# Patient Record
Sex: MALE | HISPANIC OR LATINO | Employment: FULL TIME | URBAN - METROPOLITAN AREA
[De-identification: names, ages, dates, MRNs, and addresses within clinical notes are randomized per-mention and may not be internally consistent; named-entity substitution may affect disease eponyms.]

---

## 2021-12-07 PROCEDURE — 93005 ELECTROCARDIOGRAM TRACING: CPT

## 2021-12-07 PROCEDURE — 99285 EMERGENCY DEPT VISIT HI MDM: CPT

## 2021-12-08 ENCOUNTER — APPOINTMENT (OUTPATIENT)
Dept: GENERAL RADIOLOGY | Facility: HOSPITAL | Age: 26
End: 2021-12-08

## 2021-12-08 ENCOUNTER — ANESTHESIA EVENT (OUTPATIENT)
Dept: PERIOP | Facility: HOSPITAL | Age: 26
End: 2021-12-08

## 2021-12-08 ENCOUNTER — ANESTHESIA (OUTPATIENT)
Dept: PERIOP | Facility: HOSPITAL | Age: 26
End: 2021-12-08

## 2021-12-08 ENCOUNTER — HOSPITAL ENCOUNTER (INPATIENT)
Facility: HOSPITAL | Age: 26
LOS: 1 days | Discharge: HOME | End: 2021-12-10
Attending: EMERGENCY MEDICINE | Admitting: SURGERY

## 2021-12-08 ENCOUNTER — APPOINTMENT (OUTPATIENT)
Dept: ULTRASOUND IMAGING | Facility: HOSPITAL | Age: 26
End: 2021-12-08

## 2021-12-08 ENCOUNTER — APPOINTMENT (OUTPATIENT)
Dept: CT IMAGING | Facility: HOSPITAL | Age: 26
End: 2021-12-08

## 2021-12-08 DIAGNOSIS — K81.0 ACUTE CHOLECYSTITIS: Primary | ICD-10-CM

## 2021-12-08 DIAGNOSIS — R11.2 NAUSEA AND VOMITING, INTRACTABILITY OF VOMITING NOT SPECIFIED, UNSPECIFIED VOMITING TYPE: ICD-10-CM

## 2021-12-08 DIAGNOSIS — K80.63 CALCULUS OF GALLBLADDER AND BILE DUCT WITH ACUTE CHOLECYSTITIS, WITH OBSTRUCTION: ICD-10-CM

## 2021-12-08 LAB
ALBUMIN SERPL-MCNC: 4.8 G/DL (ref 3.5–5.2)
ALBUMIN/GLOB SERPL: 1.5 G/DL
ALP SERPL-CCNC: 107 U/L (ref 39–117)
ALT SERPL W P-5'-P-CCNC: 51 U/L (ref 1–41)
ANION GAP SERPL CALCULATED.3IONS-SCNC: 11.5 MMOL/L (ref 5–15)
AST SERPL-CCNC: 29 U/L (ref 1–40)
BASOPHILS # BLD AUTO: 0.05 10*3/MM3 (ref 0–0.2)
BASOPHILS NFR BLD AUTO: 0.5 % (ref 0–1.5)
BILIRUB SERPL-MCNC: 0.2 MG/DL (ref 0–1.2)
BUN SERPL-MCNC: 13 MG/DL (ref 6–20)
BUN/CREAT SERPL: 17.8 (ref 7–25)
CALCIUM SPEC-SCNC: 9.4 MG/DL (ref 8.6–10.5)
CHLORIDE SERPL-SCNC: 101 MMOL/L (ref 98–107)
CO2 SERPL-SCNC: 27.5 MMOL/L (ref 22–29)
CREAT SERPL-MCNC: 0.73 MG/DL (ref 0.76–1.27)
DEPRECATED RDW RBC AUTO: 36.7 FL (ref 37–54)
EOSINOPHIL # BLD AUTO: 0.17 10*3/MM3 (ref 0–0.4)
EOSINOPHIL NFR BLD AUTO: 1.7 % (ref 0.3–6.2)
ERYTHROCYTE [DISTWIDTH] IN BLOOD BY AUTOMATED COUNT: 12.7 % (ref 12.3–15.4)
GFR SERPL CREATININE-BSD FRML MDRD: 131 ML/MIN/1.73
GFR SERPL CREATININE-BSD FRML MDRD: >150 ML/MIN/1.73
GLOBULIN UR ELPH-MCNC: 3.2 GM/DL
GLUCOSE SERPL-MCNC: 131 MG/DL (ref 65–99)
HCT VFR BLD AUTO: 45.1 % (ref 37.5–51)
HGB BLD-MCNC: 15.6 G/DL (ref 13–17.7)
IMM GRANULOCYTES # BLD AUTO: 0.05 10*3/MM3 (ref 0–0.05)
IMM GRANULOCYTES NFR BLD AUTO: 0.5 % (ref 0–0.5)
LIPASE SERPL-CCNC: 32 U/L (ref 13–60)
LYMPHOCYTES # BLD AUTO: 2.7 10*3/MM3 (ref 0.7–3.1)
LYMPHOCYTES NFR BLD AUTO: 26.7 % (ref 19.6–45.3)
MCH RBC QN AUTO: 28.1 PG (ref 26.6–33)
MCHC RBC AUTO-ENTMCNC: 34.6 G/DL (ref 31.5–35.7)
MCV RBC AUTO: 81.3 FL (ref 79–97)
MONOCYTES # BLD AUTO: 0.65 10*3/MM3 (ref 0.1–0.9)
MONOCYTES NFR BLD AUTO: 6.4 % (ref 5–12)
NEUTROPHILS NFR BLD AUTO: 6.5 10*3/MM3 (ref 1.7–7)
NEUTROPHILS NFR BLD AUTO: 64.2 % (ref 42.7–76)
NRBC BLD AUTO-RTO: 0 /100 WBC (ref 0–0.2)
PLATELET # BLD AUTO: 206 10*3/MM3 (ref 140–450)
PMV BLD AUTO: 10.6 FL (ref 6–12)
POTASSIUM SERPL-SCNC: 3.7 MMOL/L (ref 3.5–5.2)
PROT SERPL-MCNC: 8 G/DL (ref 6–8.5)
QT INTERVAL: 373 MS
RBC # BLD AUTO: 5.55 10*6/MM3 (ref 4.14–5.8)
SARS-COV-2 RNA RESP QL NAA+PROBE: NOT DETECTED
SODIUM SERPL-SCNC: 140 MMOL/L (ref 136–145)
TROPONIN T SERPL-MCNC: <0.01 NG/ML (ref 0–0.03)
TROPONIN T SERPL-MCNC: <0.01 NG/ML (ref 0–0.03)
WBC NRBC COR # BLD: 10.12 10*3/MM3 (ref 3.4–10.8)

## 2021-12-08 PROCEDURE — 25010000002 PROPOFOL 10 MG/ML EMULSION: Performed by: NURSE ANESTHETIST, CERTIFIED REGISTERED

## 2021-12-08 PROCEDURE — G0378 HOSPITAL OBSERVATION PER HR: HCPCS

## 2021-12-08 PROCEDURE — C1889 IMPLANT/INSERT DEVICE, NOC: HCPCS | Performed by: SURGERY

## 2021-12-08 PROCEDURE — 83690 ASSAY OF LIPASE: CPT | Performed by: EMERGENCY MEDICINE

## 2021-12-08 PROCEDURE — 25010000002 DIPHENHYDRAMINE PER 50 MG: Performed by: NURSE ANESTHETIST, CERTIFIED REGISTERED

## 2021-12-08 PROCEDURE — 74300 X-RAY BILE DUCTS/PANCREAS: CPT

## 2021-12-08 PROCEDURE — 74177 CT ABD & PELVIS W/CONTRAST: CPT

## 2021-12-08 PROCEDURE — 25010000002 HYDROMORPHONE PER 4 MG: Performed by: NURSE ANESTHETIST, CERTIFIED REGISTERED

## 2021-12-08 PROCEDURE — 88304 TISSUE EXAM BY PATHOLOGIST: CPT | Performed by: SURGERY

## 2021-12-08 PROCEDURE — 25010000002 MORPHINE PER 10 MG: Performed by: EMERGENCY MEDICINE

## 2021-12-08 PROCEDURE — 71046 X-RAY EXAM CHEST 2 VIEWS: CPT

## 2021-12-08 PROCEDURE — 93010 ELECTROCARDIOGRAM REPORT: CPT | Performed by: INTERNAL MEDICINE

## 2021-12-08 PROCEDURE — 84484 ASSAY OF TROPONIN QUANT: CPT | Performed by: EMERGENCY MEDICINE

## 2021-12-08 PROCEDURE — 25010000002 ONDANSETRON PER 1 MG: Performed by: EMERGENCY MEDICINE

## 2021-12-08 PROCEDURE — 25010000002 ONDANSETRON PER 1 MG: Performed by: NURSE ANESTHETIST, CERTIFIED REGISTERED

## 2021-12-08 PROCEDURE — 25010000002 HYDROMORPHONE PER 4 MG: Performed by: SURGERY

## 2021-12-08 PROCEDURE — 0FT44ZZ RESECTION OF GALLBLADDER, PERCUTANEOUS ENDOSCOPIC APPROACH: ICD-10-PCS | Performed by: SURGERY

## 2021-12-08 PROCEDURE — 47563 LAPARO CHOLECYSTECTOMY/GRAPH: CPT

## 2021-12-08 PROCEDURE — 99221 1ST HOSP IP/OBS SF/LOW 40: CPT | Performed by: SURGERY

## 2021-12-08 PROCEDURE — 0 IOTHALAMATE 60 % SOLUTION: Performed by: SURGERY

## 2021-12-08 PROCEDURE — 25010000002 PIPERACILLIN SOD-TAZOBACTAM PER 1 G: Performed by: SURGERY

## 2021-12-08 PROCEDURE — 25010000002 KETOROLAC TROMETHAMINE PER 15 MG: Performed by: NURSE ANESTHETIST, CERTIFIED REGISTERED

## 2021-12-08 PROCEDURE — 76705 ECHO EXAM OF ABDOMEN: CPT

## 2021-12-08 PROCEDURE — 80053 COMPREHEN METABOLIC PANEL: CPT | Performed by: EMERGENCY MEDICINE

## 2021-12-08 PROCEDURE — 25010000002 METOCLOPRAMIDE PER 10 MG: Performed by: PHYSICIAN ASSISTANT

## 2021-12-08 PROCEDURE — 25010000002 KETOROLAC TROMETHAMINE PER 15 MG: Performed by: SURGERY

## 2021-12-08 PROCEDURE — 25010000002 FENTANYL CITRATE (PF) 50 MCG/ML SOLUTION: Performed by: NURSE ANESTHETIST, CERTIFIED REGISTERED

## 2021-12-08 PROCEDURE — 25010000002 DEXAMETHASONE PER 1 MG: Performed by: NURSE ANESTHETIST, CERTIFIED REGISTERED

## 2021-12-08 PROCEDURE — 25010000002 IOPAMIDOL 61 % SOLUTION: Performed by: EMERGENCY MEDICINE

## 2021-12-08 PROCEDURE — 47563 LAPARO CHOLECYSTECTOMY/GRAPH: CPT | Performed by: SURGERY

## 2021-12-08 PROCEDURE — U0003 INFECTIOUS AGENT DETECTION BY NUCLEIC ACID (DNA OR RNA); SEVERE ACUTE RESPIRATORY SYNDROME CORONAVIRUS 2 (SARS-COV-2) (CORONAVIRUS DISEASE [COVID-19]), AMPLIFIED PROBE TECHNIQUE, MAKING USE OF HIGH THROUGHPUT TECHNOLOGIES AS DESCRIBED BY CMS-2020-01-R: HCPCS | Performed by: EMERGENCY MEDICINE

## 2021-12-08 PROCEDURE — 25010000002 SUCCINYLCHOLINE PER 20 MG: Performed by: NURSE ANESTHETIST, CERTIFIED REGISTERED

## 2021-12-08 PROCEDURE — 25010000002 HYDROMORPHONE PER 4 MG: Performed by: EMERGENCY MEDICINE

## 2021-12-08 PROCEDURE — 85025 COMPLETE CBC W/AUTO DIFF WBC: CPT | Performed by: EMERGENCY MEDICINE

## 2021-12-08 DEVICE — SEAL HEMO SURG ARISTA/AH ABS/PWDR 3GM: Type: IMPLANTABLE DEVICE | Site: ABDOMEN | Status: FUNCTIONAL

## 2021-12-08 DEVICE — CLIP LIGAT VASC HORIZON TI MD/LG GRN 6CT: Type: IMPLANTABLE DEVICE | Site: ABDOMEN | Status: FUNCTIONAL

## 2021-12-08 DEVICE — CLIP LIG HEMOLOK PA 6CT MD/LG GRN: Type: IMPLANTABLE DEVICE | Site: ABDOMEN | Status: FUNCTIONAL

## 2021-12-08 RX ORDER — PROMETHAZINE HYDROCHLORIDE 25 MG/1
25 TABLET ORAL ONCE AS NEEDED
Status: DISCONTINUED | OUTPATIENT
Start: 2021-12-08 | End: 2021-12-09 | Stop reason: HOSPADM

## 2021-12-08 RX ORDER — BUPIVACAINE HYDROCHLORIDE AND EPINEPHRINE 5; 5 MG/ML; UG/ML
INJECTION, SOLUTION EPIDURAL; INTRACAUDAL; PERINEURAL AS NEEDED
Status: DISCONTINUED | OUTPATIENT
Start: 2021-12-08 | End: 2021-12-09 | Stop reason: HOSPADM

## 2021-12-08 RX ORDER — KETOROLAC TROMETHAMINE 30 MG/ML
INJECTION, SOLUTION INTRAMUSCULAR; INTRAVENOUS AS NEEDED
Status: DISCONTINUED | OUTPATIENT
Start: 2021-12-08 | End: 2021-12-09 | Stop reason: SURG

## 2021-12-08 RX ORDER — HYDROMORPHONE HCL 110MG/55ML
PATIENT CONTROLLED ANALGESIA SYRINGE INTRAVENOUS AS NEEDED
Status: DISCONTINUED | OUTPATIENT
Start: 2021-12-08 | End: 2021-12-09 | Stop reason: SURG

## 2021-12-08 RX ORDER — MORPHINE SULFATE 10 MG/ML
6 INJECTION INTRAMUSCULAR; INTRAVENOUS; SUBCUTANEOUS ONCE
Status: COMPLETED | OUTPATIENT
Start: 2021-12-08 | End: 2021-12-08

## 2021-12-08 RX ORDER — HYDROMORPHONE HYDROCHLORIDE 1 MG/ML
0.5 INJECTION, SOLUTION INTRAMUSCULAR; INTRAVENOUS; SUBCUTANEOUS
Status: DISCONTINUED | OUTPATIENT
Start: 2021-12-08 | End: 2021-12-09 | Stop reason: HOSPADM

## 2021-12-08 RX ORDER — METOCLOPRAMIDE HYDROCHLORIDE 5 MG/ML
5 INJECTION INTRAMUSCULAR; INTRAVENOUS ONCE
Status: COMPLETED | OUTPATIENT
Start: 2021-12-08 | End: 2021-12-08

## 2021-12-08 RX ORDER — LIDOCAINE HYDROCHLORIDE 20 MG/ML
INJECTION, SOLUTION INFILTRATION; PERINEURAL AS NEEDED
Status: DISCONTINUED | OUTPATIENT
Start: 2021-12-08 | End: 2021-12-09 | Stop reason: SURG

## 2021-12-08 RX ORDER — DEXAMETHASONE SODIUM PHOSPHATE 10 MG/ML
INJECTION INTRAMUSCULAR; INTRAVENOUS AS NEEDED
Status: DISCONTINUED | OUTPATIENT
Start: 2021-12-08 | End: 2021-12-09 | Stop reason: SURG

## 2021-12-08 RX ORDER — DIPHENHYDRAMINE HYDROCHLORIDE 50 MG/ML
12.5 INJECTION INTRAMUSCULAR; INTRAVENOUS
Status: DISCONTINUED | OUTPATIENT
Start: 2021-12-08 | End: 2021-12-09 | Stop reason: HOSPADM

## 2021-12-08 RX ORDER — MIDAZOLAM HYDROCHLORIDE 1 MG/ML
1 INJECTION INTRAMUSCULAR; INTRAVENOUS
Status: DISCONTINUED | OUTPATIENT
Start: 2021-12-08 | End: 2021-12-09 | Stop reason: HOSPADM

## 2021-12-08 RX ORDER — ONDANSETRON 2 MG/ML
4 INJECTION INTRAMUSCULAR; INTRAVENOUS ONCE
Status: COMPLETED | OUTPATIENT
Start: 2021-12-08 | End: 2021-12-08

## 2021-12-08 RX ORDER — IBUPROFEN 600 MG/1
600 TABLET ORAL ONCE AS NEEDED
Status: DISCONTINUED | OUTPATIENT
Start: 2021-12-08 | End: 2021-12-09 | Stop reason: HOSPADM

## 2021-12-08 RX ORDER — CEFAZOLIN SODIUM 2 G/100ML
2 INJECTION, SOLUTION INTRAVENOUS ONCE
Status: CANCELLED | OUTPATIENT
Start: 2021-12-08 | End: 2021-12-08

## 2021-12-08 RX ORDER — SODIUM CHLORIDE 0.9 % (FLUSH) 0.9 %
3 SYRINGE (ML) INJECTION EVERY 12 HOURS SCHEDULED
Status: DISCONTINUED | OUTPATIENT
Start: 2021-12-08 | End: 2021-12-09 | Stop reason: HOSPADM

## 2021-12-08 RX ORDER — SODIUM CHLORIDE 0.9 % (FLUSH) 0.9 %
3-10 SYRINGE (ML) INJECTION AS NEEDED
Status: DISCONTINUED | OUTPATIENT
Start: 2021-12-08 | End: 2021-12-09 | Stop reason: HOSPADM

## 2021-12-08 RX ORDER — FENTANYL CITRATE 50 UG/ML
INJECTION, SOLUTION INTRAMUSCULAR; INTRAVENOUS AS NEEDED
Status: DISCONTINUED | OUTPATIENT
Start: 2021-12-08 | End: 2021-12-09 | Stop reason: SURG

## 2021-12-08 RX ORDER — SODIUM CHLORIDE 9 MG/ML
INJECTION, SOLUTION INTRAVENOUS AS NEEDED
Status: DISCONTINUED | OUTPATIENT
Start: 2021-12-08 | End: 2021-12-09 | Stop reason: HOSPADM

## 2021-12-08 RX ORDER — HYDROCODONE BITARTRATE AND ACETAMINOPHEN 7.5; 325 MG/1; MG/1
1 TABLET ORAL ONCE AS NEEDED
Status: DISCONTINUED | OUTPATIENT
Start: 2021-12-08 | End: 2021-12-09 | Stop reason: HOSPADM

## 2021-12-08 RX ORDER — SODIUM CHLORIDE, SODIUM LACTATE, POTASSIUM CHLORIDE, CALCIUM CHLORIDE 600; 310; 30; 20 MG/100ML; MG/100ML; MG/100ML; MG/100ML
INJECTION, SOLUTION INTRAVENOUS CONTINUOUS PRN
Status: DISCONTINUED | OUTPATIENT
Start: 2021-12-08 | End: 2021-12-09 | Stop reason: SURG

## 2021-12-08 RX ORDER — PROPOFOL 10 MG/ML
VIAL (ML) INTRAVENOUS AS NEEDED
Status: DISCONTINUED | OUTPATIENT
Start: 2021-12-08 | End: 2021-12-09 | Stop reason: SURG

## 2021-12-08 RX ORDER — DIPHENHYDRAMINE HYDROCHLORIDE 50 MG/ML
INJECTION INTRAMUSCULAR; INTRAVENOUS AS NEEDED
Status: DISCONTINUED | OUTPATIENT
Start: 2021-12-08 | End: 2021-12-09 | Stop reason: SURG

## 2021-12-08 RX ORDER — NALOXONE HCL 0.4 MG/ML
0.2 VIAL (ML) INJECTION AS NEEDED
Status: DISCONTINUED | OUTPATIENT
Start: 2021-12-08 | End: 2021-12-09 | Stop reason: HOSPADM

## 2021-12-08 RX ORDER — FLUMAZENIL 0.1 MG/ML
0.2 INJECTION INTRAVENOUS AS NEEDED
Status: DISCONTINUED | OUTPATIENT
Start: 2021-12-08 | End: 2021-12-09 | Stop reason: HOSPADM

## 2021-12-08 RX ORDER — SODIUM CHLORIDE 0.9 % (FLUSH) 0.9 %
3-10 SYRINGE (ML) INJECTION AS NEEDED
Status: DISCONTINUED | OUTPATIENT
Start: 2021-12-08 | End: 2021-12-10 | Stop reason: HOSPADM

## 2021-12-08 RX ORDER — ROCURONIUM BROMIDE 10 MG/ML
INJECTION, SOLUTION INTRAVENOUS AS NEEDED
Status: DISCONTINUED | OUTPATIENT
Start: 2021-12-08 | End: 2021-12-09 | Stop reason: SURG

## 2021-12-08 RX ORDER — LIDOCAINE HYDROCHLORIDE 20 MG/ML
15 SOLUTION OROPHARYNGEAL ONCE
Status: COMPLETED | OUTPATIENT
Start: 2021-12-08 | End: 2021-12-08

## 2021-12-08 RX ORDER — ONDANSETRON 2 MG/ML
4 INJECTION INTRAMUSCULAR; INTRAVENOUS EVERY 6 HOURS PRN
Status: DISCONTINUED | OUTPATIENT
Start: 2021-12-08 | End: 2021-12-10 | Stop reason: HOSPADM

## 2021-12-08 RX ORDER — SODIUM CHLORIDE, SODIUM LACTATE, POTASSIUM CHLORIDE, CALCIUM CHLORIDE 600; 310; 30; 20 MG/100ML; MG/100ML; MG/100ML; MG/100ML
75 INJECTION, SOLUTION INTRAVENOUS CONTINUOUS
Status: DISCONTINUED | OUTPATIENT
Start: 2021-12-08 | End: 2021-12-10

## 2021-12-08 RX ORDER — ALUMINA, MAGNESIA, AND SIMETHICONE 2400; 2400; 240 MG/30ML; MG/30ML; MG/30ML
15 SUSPENSION ORAL ONCE
Status: COMPLETED | OUTPATIENT
Start: 2021-12-08 | End: 2021-12-08

## 2021-12-08 RX ORDER — LABETALOL HYDROCHLORIDE 5 MG/ML
5 INJECTION, SOLUTION INTRAVENOUS
Status: DISCONTINUED | OUTPATIENT
Start: 2021-12-08 | End: 2021-12-09 | Stop reason: HOSPADM

## 2021-12-08 RX ORDER — FAMOTIDINE 10 MG/ML
20 INJECTION, SOLUTION INTRAVENOUS ONCE
Status: COMPLETED | OUTPATIENT
Start: 2021-12-08 | End: 2021-12-08

## 2021-12-08 RX ORDER — SODIUM CHLORIDE 0.9 % (FLUSH) 0.9 %
3 SYRINGE (ML) INJECTION EVERY 12 HOURS SCHEDULED
Status: DISCONTINUED | OUTPATIENT
Start: 2021-12-08 | End: 2021-12-10 | Stop reason: HOSPADM

## 2021-12-08 RX ORDER — PROMETHAZINE HYDROCHLORIDE 25 MG/1
25 SUPPOSITORY RECTAL ONCE AS NEEDED
Status: DISCONTINUED | OUTPATIENT
Start: 2021-12-08 | End: 2021-12-09 | Stop reason: HOSPADM

## 2021-12-08 RX ORDER — ONDANSETRON 2 MG/ML
4 INJECTION INTRAMUSCULAR; INTRAVENOUS ONCE AS NEEDED
Status: DISCONTINUED | OUTPATIENT
Start: 2021-12-08 | End: 2021-12-09 | Stop reason: HOSPADM

## 2021-12-08 RX ORDER — HYDROMORPHONE HYDROCHLORIDE 1 MG/ML
0.5 INJECTION, SOLUTION INTRAMUSCULAR; INTRAVENOUS; SUBCUTANEOUS
Status: DISCONTINUED | OUTPATIENT
Start: 2021-12-08 | End: 2021-12-10 | Stop reason: HOSPADM

## 2021-12-08 RX ORDER — SUCCINYLCHOLINE CHLORIDE 20 MG/ML
INJECTION INTRAMUSCULAR; INTRAVENOUS AS NEEDED
Status: DISCONTINUED | OUTPATIENT
Start: 2021-12-08 | End: 2021-12-09 | Stop reason: SURG

## 2021-12-08 RX ORDER — LIDOCAINE HYDROCHLORIDE 10 MG/ML
0.5 INJECTION, SOLUTION EPIDURAL; INFILTRATION; INTRACAUDAL; PERINEURAL ONCE AS NEEDED
Status: DISCONTINUED | OUTPATIENT
Start: 2021-12-08 | End: 2021-12-09 | Stop reason: HOSPADM

## 2021-12-08 RX ORDER — KETOROLAC TROMETHAMINE 15 MG/ML
15 INJECTION, SOLUTION INTRAMUSCULAR; INTRAVENOUS EVERY 6 HOURS PRN
Status: DISCONTINUED | OUTPATIENT
Start: 2021-12-08 | End: 2021-12-10 | Stop reason: HOSPADM

## 2021-12-08 RX ORDER — ONDANSETRON 2 MG/ML
INJECTION INTRAMUSCULAR; INTRAVENOUS AS NEEDED
Status: DISCONTINUED | OUTPATIENT
Start: 2021-12-08 | End: 2021-12-09 | Stop reason: SURG

## 2021-12-08 RX ORDER — HYDROMORPHONE HYDROCHLORIDE 1 MG/ML
0.5 INJECTION, SOLUTION INTRAMUSCULAR; INTRAVENOUS; SUBCUTANEOUS ONCE
Status: COMPLETED | OUTPATIENT
Start: 2021-12-08 | End: 2021-12-08

## 2021-12-08 RX ORDER — MAGNESIUM HYDROXIDE 1200 MG/15ML
LIQUID ORAL AS NEEDED
Status: DISCONTINUED | OUTPATIENT
Start: 2021-12-08 | End: 2021-12-09 | Stop reason: HOSPADM

## 2021-12-08 RX ORDER — DIPHENHYDRAMINE HCL 25 MG
25 CAPSULE ORAL
Status: DISCONTINUED | OUTPATIENT
Start: 2021-12-08 | End: 2021-12-09 | Stop reason: HOSPADM

## 2021-12-08 RX ORDER — FENTANYL CITRATE 50 UG/ML
50 INJECTION, SOLUTION INTRAMUSCULAR; INTRAVENOUS
Status: DISCONTINUED | OUTPATIENT
Start: 2021-12-08 | End: 2021-12-09 | Stop reason: HOSPADM

## 2021-12-08 RX ORDER — EPHEDRINE SULFATE 50 MG/ML
5 INJECTION, SOLUTION INTRAVENOUS ONCE AS NEEDED
Status: DISCONTINUED | OUTPATIENT
Start: 2021-12-08 | End: 2021-12-09 | Stop reason: HOSPADM

## 2021-12-08 RX ORDER — SODIUM CHLORIDE, SODIUM LACTATE, POTASSIUM CHLORIDE, CALCIUM CHLORIDE 600; 310; 30; 20 MG/100ML; MG/100ML; MG/100ML; MG/100ML
9 INJECTION, SOLUTION INTRAVENOUS CONTINUOUS
Status: DISCONTINUED | OUTPATIENT
Start: 2021-12-08 | End: 2021-12-09

## 2021-12-08 RX ORDER — NITROGLYCERIN 0.4 MG/1
0.4 TABLET SUBLINGUAL
Status: DISCONTINUED | OUTPATIENT
Start: 2021-12-08 | End: 2021-12-08

## 2021-12-08 RX ORDER — ONDANSETRON 4 MG/1
4 TABLET, FILM COATED ORAL EVERY 6 HOURS PRN
Status: DISCONTINUED | OUTPATIENT
Start: 2021-12-08 | End: 2021-12-10 | Stop reason: HOSPADM

## 2021-12-08 RX ORDER — SODIUM CHLORIDE 0.9 % (FLUSH) 0.9 %
10 SYRINGE (ML) INJECTION AS NEEDED
Status: DISCONTINUED | OUTPATIENT
Start: 2021-12-08 | End: 2021-12-10 | Stop reason: HOSPADM

## 2021-12-08 RX ORDER — HYDRALAZINE HYDROCHLORIDE 20 MG/ML
5 INJECTION INTRAMUSCULAR; INTRAVENOUS
Status: DISCONTINUED | OUTPATIENT
Start: 2021-12-08 | End: 2021-12-09 | Stop reason: HOSPADM

## 2021-12-08 RX ORDER — OXYCODONE AND ACETAMINOPHEN 7.5; 325 MG/1; MG/1
1 TABLET ORAL EVERY 4 HOURS PRN
Status: DISCONTINUED | OUTPATIENT
Start: 2021-12-08 | End: 2021-12-09 | Stop reason: HOSPADM

## 2021-12-08 RX ADMIN — FAMOTIDINE 20 MG: 10 INJECTION, SOLUTION INTRAVENOUS at 00:22

## 2021-12-08 RX ADMIN — KETOROLAC TROMETHAMINE 15 MG: 15 INJECTION, SOLUTION INTRAMUSCULAR; INTRAVENOUS at 16:33

## 2021-12-08 RX ADMIN — ROCURONIUM BROMIDE 10 MG: 50 INJECTION INTRAVENOUS at 22:13

## 2021-12-08 RX ADMIN — SODIUM CHLORIDE, PRESERVATIVE FREE 3 ML: 5 INJECTION INTRAVENOUS at 08:14

## 2021-12-08 RX ADMIN — SUCCINYLCHOLINE CHLORIDE 100 MG: 20 INJECTION, SOLUTION INTRAMUSCULAR; INTRAVENOUS; PARENTERAL at 21:09

## 2021-12-08 RX ADMIN — SODIUM CHLORIDE, POTASSIUM CHLORIDE, SODIUM LACTATE AND CALCIUM CHLORIDE 9 ML/HR: 600; 310; 30; 20 INJECTION, SOLUTION INTRAVENOUS at 17:24

## 2021-12-08 RX ADMIN — MORPHINE SULFATE 6 MG: 10 INJECTION, SOLUTION INTRAMUSCULAR; INTRAVENOUS at 01:26

## 2021-12-08 RX ADMIN — FAMOTIDINE 20 MG: 10 INJECTION INTRAVENOUS at 17:24

## 2021-12-08 RX ADMIN — ROCURONIUM BROMIDE 10 MG: 50 INJECTION INTRAVENOUS at 21:09

## 2021-12-08 RX ADMIN — SODIUM CHLORIDE, POTASSIUM CHLORIDE, SODIUM LACTATE AND CALCIUM CHLORIDE 100 ML/HR: 600; 310; 30; 20 INJECTION, SOLUTION INTRAVENOUS at 19:15

## 2021-12-08 RX ADMIN — FENTANYL CITRATE 100 MCG: 0.05 INJECTION, SOLUTION INTRAMUSCULAR; INTRAVENOUS at 21:09

## 2021-12-08 RX ADMIN — PROPOFOL 200 MG: 10 INJECTION, EMULSION INTRAVENOUS at 21:09

## 2021-12-08 RX ADMIN — SODIUM CHLORIDE, POTASSIUM CHLORIDE, SODIUM LACTATE AND CALCIUM CHLORIDE: 600; 310; 30; 20 INJECTION, SOLUTION INTRAVENOUS at 21:06

## 2021-12-08 RX ADMIN — DIPHENHYDRAMINE HYDROCHLORIDE 25 MG: 50 INJECTION, SOLUTION INTRAMUSCULAR; INTRAVENOUS at 21:40

## 2021-12-08 RX ADMIN — HYDROMORPHONE HYDROCHLORIDE 0.5 MG: 1 INJECTION, SOLUTION INTRAMUSCULAR; INTRAVENOUS; SUBCUTANEOUS at 10:51

## 2021-12-08 RX ADMIN — METOCLOPRAMIDE HYDROCHLORIDE 5 MG: 5 INJECTION INTRAMUSCULAR; INTRAVENOUS at 02:23

## 2021-12-08 RX ADMIN — TAZOBACTAM SODIUM AND PIPERACILLIN SODIUM 3.38 G: 375; 3 INJECTION, SOLUTION INTRAVENOUS at 10:47

## 2021-12-08 RX ADMIN — MAGNESIUM HYDROXIDE,ALUMINUM HYDROXICE,SIMETHICONE 15 ML: 240; 2400; 2400 SUSPENSION ORAL at 00:23

## 2021-12-08 RX ADMIN — KETOROLAC TROMETHAMINE 30 MG: 30 INJECTION, SOLUTION INTRAMUSCULAR at 23:44

## 2021-12-08 RX ADMIN — ONDANSETRON 4 MG: 2 INJECTION INTRAMUSCULAR; INTRAVENOUS at 01:24

## 2021-12-08 RX ADMIN — HYDROMORPHONE HYDROCHLORIDE 0.5 MG: 1 INJECTION, SOLUTION INTRAMUSCULAR; INTRAVENOUS; SUBCUTANEOUS at 14:46

## 2021-12-08 RX ADMIN — ROCURONIUM BROMIDE 35 MG: 50 INJECTION INTRAVENOUS at 21:24

## 2021-12-08 RX ADMIN — SODIUM CHLORIDE, POTASSIUM CHLORIDE, SODIUM LACTATE AND CALCIUM CHLORIDE 100 ML/HR: 600; 310; 30; 20 INJECTION, SOLUTION INTRAVENOUS at 08:09

## 2021-12-08 RX ADMIN — HYDROMORPHONE HYDROCHLORIDE 0.5 MG: 2 INJECTION, SOLUTION INTRAMUSCULAR; INTRAVENOUS; SUBCUTANEOUS at 22:30

## 2021-12-08 RX ADMIN — LIDOCAINE HYDROCHLORIDE 15 ML: 20 SOLUTION ORAL; TOPICAL at 00:24

## 2021-12-08 RX ADMIN — IOPAMIDOL 85 ML: 612 INJECTION, SOLUTION INTRAVENOUS at 01:38

## 2021-12-08 RX ADMIN — DEXAMETHASONE SODIUM PHOSPHATE 8 MG: 10 INJECTION INTRAMUSCULAR; INTRAVENOUS at 21:13

## 2021-12-08 RX ADMIN — HYDROMORPHONE HYDROCHLORIDE 0.5 MG: 1 INJECTION, SOLUTION INTRAMUSCULAR; INTRAVENOUS; SUBCUTANEOUS at 02:23

## 2021-12-08 RX ADMIN — SUGAMMADEX 200 MG: 100 INJECTION, SOLUTION INTRAVENOUS at 23:50

## 2021-12-08 RX ADMIN — ONDANSETRON 4 MG: 2 INJECTION INTRAMUSCULAR; INTRAVENOUS at 22:51

## 2021-12-08 RX ADMIN — HYDROMORPHONE HYDROCHLORIDE 0.5 MG: 2 INJECTION, SOLUTION INTRAMUSCULAR; INTRAVENOUS; SUBCUTANEOUS at 21:49

## 2021-12-08 RX ADMIN — HYDROMORPHONE HYDROCHLORIDE 0.5 MG: 1 INJECTION, SOLUTION INTRAMUSCULAR; INTRAVENOUS; SUBCUTANEOUS at 08:09

## 2021-12-08 RX ADMIN — SODIUM CHLORIDE, POTASSIUM CHLORIDE, SODIUM LACTATE AND CALCIUM CHLORIDE: 600; 310; 30; 20 INJECTION, SOLUTION INTRAVENOUS at 22:48

## 2021-12-08 RX ADMIN — LIDOCAINE HYDROCHLORIDE 50 MG: 20 INJECTION, SOLUTION INFILTRATION; PERINEURAL at 21:09

## 2021-12-08 RX ADMIN — TAZOBACTAM SODIUM AND PIPERACILLIN SODIUM 3.38 G: 375; 3 INJECTION, SOLUTION INTRAVENOUS at 19:12

## 2021-12-08 NOTE — ED NOTES
"Pt's primary language is [place] and Information was obtained via  [id number]. Pt comes to ED via PV for c/o \"air on top of his heart and back.\" Pt said he was working around noon today and drank a Coke and then felt like air became trapped on heart and back, described as \"exploding on the inside\" to the point where he cannot speak or lay down, 9/10. Pt denies NVD. Pt has allergies to shrimp. Address is [address] [place] [address] [address], [address]. Pt said he has had this pain before about a year ago and it lasted for 2 days. Pt denies abuse, SI, HI, and falling. Pt said he does not take any medications, has no medical conditions and has never had a surgery. Pt said he does not smoke or drink alcohol. Pt said his weight is 160 lbs.     Isatu Jorgensen, MARCIE  12/08/21 0001    "

## 2021-12-08 NOTE — PLAN OF CARE
Goal Outcome Evaluation:              Outcome Summary: Pt admitted from ED with cholecystitis and gallstones. Pt aox4, up ad michelle and on room air. Pt speaks little english but understands well. VSS will continue to monitor.

## 2021-12-08 NOTE — ED NOTES
..  Nursing report ED to floor  Gal Shirley  26 y.o.  male    HPI :   Chief Complaint   Patient presents with   • Chest Pain   • Back Pain       Admitting doctor:   Richard Cruz MD    Admitting diagnosis:   The primary encounter diagnosis was Acute cholecystitis. Diagnoses of Nausea and vomiting, intractability of vomiting not specified, unspecified vomiting type and Calculus of gallbladder and bile duct with acute cholecystitis, with obstruction were also pertinent to this visit.    Code status:   Current Code Status     Date Active Code Status Order ID Comments User Context       Not on file    Advance Care Planning Activity          Allergies:   Patient has no known allergies.    Intake and Output  No intake or output data in the 24 hours ending 12/08/21 0604    Weight:       12/08/21  0013   Weight: 72.6 kg (160 lb)       Most recent vitals:   Vitals:    12/08/21 0316 12/08/21 0330 12/08/21 0401 12/08/21 0501   BP: 134/89  130/87 127/89   BP Location:       Patient Position:       Pulse: 82 80 76 76   Resp:       Temp:       TempSrc:       SpO2: 97% 95% 94% 94%   Weight:       Height:           Active LDAs/IV Access:   Lines, Drains & Airways     Active LDAs     Name Placement date Placement time Site Days    Peripheral IV 12/08/21 0013 Left Antecubital 12/08/21  0013  Antecubital  less than 1                Labs (abnormal labs have a star):   Labs Reviewed   COMPREHENSIVE METABOLIC PANEL - Abnormal; Notable for the following components:       Result Value    Glucose 131 (*)     Creatinine 0.73 (*)     ALT (SGPT) 51 (*)     All other components within normal limits    Narrative:     GFR Normal >60  Chronic Kidney Disease <60  Kidney Failure <15     CBC WITH AUTO DIFFERENTIAL - Abnormal; Notable for the following components:    RDW-SD 36.7 (*)     All other components within normal limits   COVID-19,[facility] IN-HOUSE CEPHEID/ELBA, NP SWAB IN TRANSPORT MEDIA 8-12 HR TAT - Normal    Narrative:      Fact sheet for providers: https://www.fda.gov/media/585990/download     Fact sheet for patients: https://www.fda.gov/media/301617/download   LIPASE - Normal   TROPONIN (IN-HOUSE) - Normal    Narrative:     Troponin T Reference Range:  <= 0.03 ng/mL-   Negative for AMI  >0.03 ng/mL-     Abnormal for myocardial necrosis.  Clinicians would have to utilize clinical acumen, EKG, Troponin and serial changes to determine if it is an Acute Myocardial Infarction or myocardial injury due to an underlying chronic condition.       Results may be falsely decreased if patient taking Biotin.     TROPONIN (IN-HOUSE) - Normal    Narrative:     Troponin T Reference Range:  <= 0.03 ng/mL-   Negative for AMI  >0.03 ng/mL-     Abnormal for myocardial necrosis.  Clinicians would have to utilize clinical acumen, EKG, Troponin and serial changes to determine if it is an Acute Myocardial Infarction or myocardial injury due to an underlying chronic condition.       Results may be falsely decreased if patient taking Biotin.     COVID PRE-OP / PRE-PROCEDURE SCREENING ORDER (NO ISOLATION)    Narrative:     The following orders were created for panel order COVID PRE-OP / PRE-PROCEDURE SCREENING ORDER (NO ISOLATION) - Swab, Nasopharynx.  Procedure                               Abnormality         Status                     ---------                               -----------         ------                     COVID-19,[facility]-HOUSE...[[id number]]  Normal              Final result                 Please view results for these tests on the individual orders.   CBC AND DIFFERENTIAL    Narrative:     The following orders were created for panel order CBC & Differential.  Procedure                               Abnormality         Status                     ---------                               -----------         ------                     CBC Auto Differential[[id number]]        Abnormal            Final result                 Please view results  for these tests on the individual orders.       EKG:   ECG 12 Lead   Preliminary Result   HEART RATE= 63  bpm   RR Interval= 952  ms   DC Interval= 193  ms   P Horizontal Axis= 46  deg   P Front Axis= 13  deg   QRSD Interval= 102  ms   QT Interval= 373  ms   QRS Axis= 82  deg   T Wave Axis= 37  deg   - NORMAL ECG -   Sinus rhythm   ST elev, probable normal early repol pattern   Electronically Signed By:    Date and Time of Study: 2021-12-08 00:02:13          Meds given in ED:   Medications   sodium chloride 0.9 % flush 10 mL (has no administration in time range)   nitroglycerin (NITROSTAT) SL tablet 0.4 mg (has no administration in time range)   aluminum-magnesium hydroxide-simethicone (MAALOX MAX) 400-400-40 MG/5ML suspension 15 mL (15 mL Oral Given 12/8/21 0023)   Lidocaine Viscous HCl (XYLOCAINE) 2 % solution 15 mL (15 mL Mouth/Throat Given 12/8/21 0024)   famotidine (PEPCID) injection 20 mg (20 mg Intravenous Given 12/8/21 0022)   Morphine injection 6 mg (6 mg Intravenous Given 12/8/21 0126)   ondansetron (ZOFRAN) injection 4 mg (4 mg Intravenous Given 12/8/21 0124)   iopamidol (ISOVUE-300) 61 % injection 100 mL (85 mL Intravenous Given 12/8/21 0138)   metoclopramide (REGLAN) injection 5 mg (5 mg Intravenous Given 12/8/21 0223)   HYDROmorphone (DILAUDID) injection 0.5 mg (0.5 mg Intravenous Given 12/8/21 0223)       Imaging results:  XR Chest 2 View    Result Date: 12/8/2021  No evidence for active disease in the chest.  This report was finalized on 12/8/2021 12:55 AM by Dr. Tunde North M.D.      CT Abdomen Pelvis With Contrast    Result Date: 12/8/2021  1.  Gallbladder is distended. There is motion related artifact with equivocal mild gallbladder wall thickening. The findings may represent cholecystitis and this needs correlation with clinical data and could be further evaluated with right upper quadrant sonogram. 2. Short segment of the appendix is mildly enlarged measuring 8 mm without further evidence  to suggest appendicitis or periappendiceal inflammation. Appendicitis is doubted though follow-up could be obtained if symptoms persist or worsen.  Discussed with Adarsh Christine in the emergency department 12/8/2021 at 2:00 AM.  Radiation dose reduction techniques were utilized, including automated exposure control and exposure modulation based on body size.  This report was finalized on 12/8/2021 2:02 AM by Dr. Tunde North M.D.      US Gallbladder    Result Date: 12/8/2021  Findings are cholecystitis. The gallbladder is distended and there are gallstones with area of shadowing consistent with a stone near the gallbladder neck. The common duct measures 6 mm which is mildly dilated without intrahepatic biliary duct dilatation.  Discussed with GINA Mclain in the emergency department 12/8/2021 at 3:10 AM.  This report was finalized on 12/8/2021 3:13 AM by Dr. Tunde North M.D.        Ambulatory status:   - ambulatory      Social issues:   Social History     Socioeconomic History   • Marital status:        NIH Stroke Scale:        Nursing report ED to floor:       Hebert Norris, RN  12/08/21 0605

## 2021-12-08 NOTE — ANESTHESIA PREPROCEDURE EVALUATION
Anesthesia Evaluation     NPO Solid Status: > 8 hours             Airway   Mallampati: I  TM distance: >3 FB  Neck ROM: full  No difficulty expected  Dental          Pulmonary - normal exam   Cardiovascular - normal exam        Neuro/Psych  GI/Hepatic/Renal/Endo      Musculoskeletal     Abdominal    Substance History      OB/GYN          Other                        Anesthesia Plan    ASA 1     general     intravenous induction     Anesthetic plan, all risks, benefits, and alternatives have been provided, discussed and informed consent has been obtained with: patient.

## 2021-12-08 NOTE — ED PROVIDER NOTES
EMERGENCY DEPARTMENT ENCOUNTER    Room Number:  23/23  Date of encounter:  12/8/2021  PCP: Provider, No Known  Historian: Patient Via     Patient was placed in face mask during triage process. Patient was wearing facemask when I entered the room and throughout our encounter. I wore full protective equipment throughout this patient encounter including a face mask, eye protection, and gloves. Hand hygiene was performed before donning protective equipment and again following doffing of PPE after leaving the room.    HPI:  Chief Complaint: Epigastric/chest pain  A complete HPI/ROS/PMH/PSH/SH/FH are unobtainable due to: N/A   Context: Gal Shirley is a 26 y.o. male who presents to the ED c/o development of epigastric/lower substernal chest discomfort that radiates into his back.  He describes it like air being trapped.  This began around noon today at work.  No clear exacerbating or relieving factors.  It is moderately severe at this time.  History of similar several years ago.  Denies cigarette use and frequent alcohol use.  Moderate discomfort at this time.  No cough or fever.  No vomiting or diarrhea      MEDICAL HISTORY REVIEW  No prior cardiac history    PAST MEDICAL HISTORY  Active Ambulatory Problems     Diagnosis Date Noted   • No Active Ambulatory Problems     Resolved Ambulatory Problems     Diagnosis Date Noted   • No Resolved Ambulatory Problems     No Additional Past Medical History         PAST SURGICAL HISTORY  No past surgical history on file.      FAMILY HISTORY  No family history on file.      SOCIAL HISTORY  Social History     Socioeconomic History   • Marital status:          ALLERGIES  Patient has no known allergies.        REVIEW OF SYSTEMS  Review of Systems     All systems reviewed and negative except for those discussed in HPI.       PHYSICAL EXAM    I have reviewed the triage vital signs and nursing notes.    ED Triage Vitals [12/08/21 0013]   Temp Heart Rate Resp BP  SpO2   98.5 °F (36.9 °C) 66 16 132/82 97 %      Temp src Heart Rate Source Patient Position BP Location FiO2 (%)   Oral Monitor Sitting Right arm --       Physical Exam    Physical Exam   Constitutional: No distress.  Uncomfortable appearing but not overtly toxic.  HENT:  Head: Normocephalic and atraumatic.   Oropharynx: Mucous membranes are moist.   Eyes: No scleral icterus. No conjunctival pallor.  Neck: Painless range of motion noted. Neck supple.   Cardiovascular: Normal rate, regular rhythm and intact distal pulses.  Pulmonary/Chest: No respiratory distress.  Occasionally splinting his respirations.  Clear to auscultation bilaterally.    Abdominal: Soft. There is moderate epigastric tenderness. There is no rebound and no guarding.  No rigidity  Musculoskeletal: Moves all extremities equally. There is no pedal edema or calf tenderness.   Neurological: Alert.  Baseline strength and sensation noted.   Skin: Skin is pink, warm, and dry. No pallor.   Psychiatric: Mood and affect normal.   Nursing note and vitals reviewed.    LAB RESULTS  Recent Results (from the past 24 hour(s))   ECG 12 Lead    Collection Time: 12/08/21 12:02 AM   Result Value Ref Range    QT Interval 373 ms   Comprehensive Metabolic Panel    Collection Time: 12/08/21 12:21 AM    Specimen: Blood   Result Value Ref Range    Glucose 131 (H) 65 - 99 mg/dL    BUN 13 6 - 20 mg/dL    Creatinine 0.73 (L) 0.76 - 1.27 mg/dL    Sodium 140 136 - 145 mmol/L    Potassium 3.7 3.5 - 5.2 mmol/L    Chloride 101 98 - 107 mmol/L    CO2 27.5 22.0 - 29.0 mmol/L    Calcium 9.4 8.6 - 10.5 mg/dL    Total Protein 8.0 6.0 - 8.5 g/dL    Albumin 4.80 3.50 - 5.20 g/dL    ALT (SGPT) 51 (H) 1 - 41 U/L    AST (SGOT) 29 1 - 40 U/L    Alkaline Phosphatase 107 39 - 117 U/L    Total Bilirubin 0.2 0.0 - 1.2 mg/dL    eGFR Non African Amer 131 >60 mL/min/1.73    eGFR  African Amer >150 >60 mL/min/1.73    Globulin 3.2 gm/dL    A/G Ratio 1.5 g/dL    BUN/Creatinine Ratio 17.8 7.0 - 25.0     Anion Gap 11.5 5.0 - 15.0 mmol/L   Lipase    Collection Time: 12/08/21 12:21 AM    Specimen: Blood   Result Value Ref Range    Lipase 32 13 - 60 U/L   Troponin    Collection Time: 12/08/21 12:21 AM    Specimen: Blood   Result Value Ref Range    Troponin T <0.010 0.000 - 0.030 ng/mL   CBC Auto Differential    Collection Time: 12/08/21 12:21 AM    Specimen: Blood   Result Value Ref Range    WBC 10.12 3.40 - 10.80 10*3/mm3    RBC 5.55 4.14 - 5.80 10*6/mm3    Hemoglobin 15.6 13.0 - 17.7 g/dL    Hematocrit 45.1 37.5 - 51.0 %    MCV 81.3 79.0 - 97.0 fL    MCH 28.1 26.6 - 33.0 pg    MCHC 34.6 31.5 - 35.7 g/dL    RDW 12.7 12.3 - 15.4 %    RDW-SD 36.7 (L) 37.0 - 54.0 fl    MPV 10.6 6.0 - 12.0 fL    Platelets 206 140 - 450 10*3/mm3    Neutrophil % 64.2 42.7 - 76.0 %    Lymphocyte % 26.7 19.6 - 45.3 %    Monocyte % 6.4 5.0 - 12.0 %    Eosinophil % 1.7 0.3 - 6.2 %    Basophil % 0.5 0.0 - 1.5 %    Immature Grans % 0.5 0.0 - 0.5 %    Neutrophils, Absolute 6.50 1.70 - 7.00 10*3/mm3    Lymphocytes, Absolute 2.70 0.70 - 3.10 10*3/mm3    Monocytes, Absolute 0.65 0.10 - 0.90 10*3/mm3    Eosinophils, Absolute 0.17 0.00 - 0.40 10*3/mm3    Basophils, Absolute 0.05 0.00 - 0.20 10*3/mm3    Immature Grans, Absolute 0.05 0.00 - 0.05 10*3/mm3    nRBC 0.0 0.0 - 0.2 /100 WBC       Ordered the above labs and independently reviewed the results.        RADIOLOGY  XR Chest 2 View    Result Date: 12/8/2021  CHEST: 2 VIEWS  HISTORY: Chest pain and back pain.  COMPARISON: None.  FINDINGS:Cardiomediastinal silhouette is normal. Lungs appear clear and there is no evidence for pulmonary edema or pleural effusion or infiltrate.      No evidence for active disease in the chest.  This report was finalized on 12/8/2021 12:55 AM by Dr. Tunde North M.D.        I ordered the above noted radiological studies. Reviewed by me and discussed with radiologist.  See dictation for official radiology interpretation.      PROCEDURES    Procedures        MEDICATIONS  GIVEN IN ER    Medications   sodium chloride 0.9 % flush 10 mL (has no administration in time range)   nitroglycerin (NITROSTAT) SL tablet 0.4 mg (has no administration in time range)   Morphine injection 6 mg (has no administration in time range)   aluminum-magnesium hydroxide-simethicone (MAALOX MAX) 400-400-40 MG/5ML suspension 15 mL (15 mL Oral Given 12/8/21 0023)   Lidocaine Viscous HCl (XYLOCAINE) 2 % solution 15 mL (15 mL Mouth/Throat Given 12/8/21 0024)   famotidine (PEPCID) injection 20 mg (20 mg Intravenous Given 12/8/21 0022)   ondansetron (ZOFRAN) injection 4 mg (4 mg Intravenous Given 12/8/21 0124)         PROGRESS, DATA ANALYSIS, CONSULTS, AND MEDICAL DECISION MAKING    My differential diagnosis for abdominal pain includes but is not limited to:  Gastritis, gastroenteritis, peptic ulcer disease, GERD, esophageal perforation, acute appendicitis, mesenteric adenitis, Meckel’s diverticulum, epiploic appendagitis, diverticulitis, colon cancer, ulcerative colitis, Crohn’s disease, intussusception, small bowel obstruction, adhesions, ischemic bowel, perforated viscus, ileus, obstipation, biliary colic, cholecystitis, cholelithiasis, Avni-Isrrael Cr, hepatitis, pancreatitis, common bile duct obstruction, cholangitis, bile leak, splenic trauma, splenic rupture, splenic infarction, splenic abscess, abdominal abscess, ascites, spontaneous bacterial peritonitis, hernia, UTI, cystitis, prostatitis, ureterolithiasis, urinary obstruction, AAA, myocardial infarction, pneumonia, cancer, porphyria, DKA, medications, sickle cell, viral syndrome, zoster      All labs have been independently reviewed by me.  All radiology studies have been reviewed by me and discussed with radiologist dictating the report.   EKG's independently viewed and interpreted by me.  Discussion below represents my analysis of pertinent findings related to patient's condition, differential diagnosis, treatment plan and final disposition.      ED  Course as of 12/08/21 0124   Wed Dec 08, 2021   0016 EKG           EKG time: 0002  Rhythm/Rate: Sinus, 65  P waves and NH: LETICIA within normal limits  QRS, axis: Narrow  ST and T waves: No STEMI; QTC within normal limits    Interpreted Contemporaneously by me, independently viewed  Comparison: Unavailable   [RS]   0123 Initial laboratory and radiologic testing is negative.  Patient feels worse now than before.  He has now vomited.  Repeat abdominal examination locates the pain mostly in the right upper quadrant on examination at this time which is very tender.  We will plan IV morphine and Zofran followed by a CT of the abdomen pelvis.  Patient agreeable with this plan. [RS]   0124 Case discussed with Adarsh Faith PA-C who is agreeable to follow-up on CT scan and make final disposition for this patient. [RS]      ED Course User Index  [RS] Timmy Kumari MD       AS OF 01:24 EST VITALS:    BP - 132/82  HR - 66  TEMP - 98.5 °F (36.9 °C) (Oral)  O2 SATS - 97%        DIAGNOSIS  Final diagnoses:   Acute abdominal pain in right upper quadrant   Nausea and vomiting, intractability of vomiting not specified, unspecified vomiting type         DISPOSITION  Pending     Timmy Kumari MD  12/08/21 0124

## 2021-12-08 NOTE — ED PROVIDER NOTES
EMERGENCY DEPARTMENT ENCOUNTER    Room number:  01/01  Date Seen:  12/8/2021  Time of transfer: 01:30  PCP:  Provider, No Known    Laboratory Results:  Recent Results (from the past 24 hour(s))   ECG 12 Lead    Collection Time: 12/08/21 12:02 AM   Result Value Ref Range    QT Interval 373 ms   Comprehensive Metabolic Panel    Collection Time: 12/08/21 12:21 AM    Specimen: Blood   Result Value Ref Range    Glucose 131 (H) 65 - 99 mg/dL    BUN 13 6 - 20 mg/dL    Creatinine 0.73 (L) 0.76 - 1.27 mg/dL    Sodium 140 136 - 145 mmol/L    Potassium 3.7 3.5 - 5.2 mmol/L    Chloride 101 98 - 107 mmol/L    CO2 27.5 22.0 - 29.0 mmol/L    Calcium 9.4 8.6 - 10.5 mg/dL    Total Protein 8.0 6.0 - 8.5 g/dL    Albumin 4.80 3.50 - 5.20 g/dL    ALT (SGPT) 51 (H) 1 - 41 U/L    AST (SGOT) 29 1 - 40 U/L    Alkaline Phosphatase 107 39 - 117 U/L    Total Bilirubin 0.2 0.0 - 1.2 mg/dL    eGFR Non African Amer 131 >60 mL/min/1.73    eGFR  African Amer >150 >60 mL/min/1.73    Globulin 3.2 gm/dL    A/G Ratio 1.5 g/dL    BUN/Creatinine Ratio 17.8 7.0 - 25.0    Anion Gap 11.5 5.0 - 15.0 mmol/L   Lipase    Collection Time: 12/08/21 12:21 AM    Specimen: Blood   Result Value Ref Range    Lipase 32 13 - 60 U/L   Troponin    Collection Time: 12/08/21 12:21 AM    Specimen: Blood   Result Value Ref Range    Troponin T <0.010 0.000 - 0.030 ng/mL   CBC Auto Differential    Collection Time: 12/08/21 12:21 AM    Specimen: Blood   Result Value Ref Range    WBC 10.12 3.40 - 10.80 10*3/mm3    RBC 5.55 4.14 - 5.80 10*6/mm3    Hemoglobin 15.6 13.0 - 17.7 g/dL    Hematocrit 45.1 37.5 - 51.0 %    MCV 81.3 79.0 - 97.0 fL    MCH 28.1 26.6 - 33.0 pg    MCHC 34.6 31.5 - 35.7 g/dL    RDW 12.7 12.3 - 15.4 %    RDW-SD 36.7 (L) 37.0 - 54.0 fl    MPV 10.6 6.0 - 12.0 fL    Platelets 206 140 - 450 10*3/mm3    Neutrophil % 64.2 42.7 - 76.0 %    Lymphocyte % 26.7 19.6 - 45.3 %    Monocyte % 6.4 5.0 - 12.0 %    Eosinophil % 1.7 0.3 - 6.2 %    Basophil % 0.5 0.0 - 1.5 %     Immature Grans % 0.5 0.0 - 0.5 %    Neutrophils, Absolute 6.50 1.70 - 7.00 10*3/mm3    Lymphocytes, Absolute 2.70 0.70 - 3.10 10*3/mm3    Monocytes, Absolute 0.65 0.10 - 0.90 10*3/mm3    Eosinophils, Absolute 0.17 0.00 - 0.40 10*3/mm3    Basophils, Absolute 0.05 0.00 - 0.20 10*3/mm3    Immature Grans, Absolute 0.05 0.00 - 0.05 10*3/mm3    nRBC 0.0 0.0 - 0.2 /100 WBC   COVID-19,[facility] CEPHE[facility]/ELBA NP SWAB IN TRANSPORT MEDIA 8-12 HR TAT - Swab, Nasopharynx    Collection Time: 12/08/21  1:29 AM    Specimen: Nasopharynx; Swab   Result Value Ref Range    COVID19 Not Detected Not Detected - Ref. Range   Troponin    Collection Time: 12/08/21  2:16 AM    Specimen: Blood   Result Value Ref Range    Troponin T <0.010 0.000 - 0.030 ng/mL     I reviewed the above results.    Radiology:  US Gallbladder   Final Result   Findings are cholecystitis. The gallbladder is distended   and there are gallstones with area of shadowing consistent with a stone   near the gallbladder neck. The common duct measures 6 mm which is mildly   dilated without intrahepatic biliary duct dilatation.       Discussed with GINA Mclain in the emergency department 12/8/2021 at   3:10 AM.       This report was finalized on 12/8/2021 3:13 AM by Dr. Tunde North M.D.          CT Abdomen Pelvis With Contrast   Final Result   1.  Gallbladder is distended. There is motion related artifact with   equivocal mild gallbladder wall thickening. The findings may represent   cholecystitis and this needs correlation with clinical data and could be   further evaluated with right upper quadrant sonogram.   2. Short segment of the appendix is mildly enlarged measuring 8 mm   without further evidence to suggest appendicitis or periappendiceal   inflammation. Appendicitis is doubted though follow-up could be obtained   if symptoms persist or worsen.       Discussed with Adarsh Christine in the emergency department 12/8/2021 at   2:00 AM.       Radiation dose  reduction techniques were utilized, including automated   exposure control and exposure modulation based on body size.       This report was finalized on 12/8/2021 2:02 AM by Dr. Tunde North M.D.          XR Chest 2 View   Final Result   No evidence for active disease in the chest.       This report was finalized on 12/8/2021 12:55 AM by Dr. Tunde North M.D.            I reviewed the above results    Medications ordered in ED:  Medications   sodium chloride 0.9 % flush 10 mL (has no administration in time range)   nitroglycerin (NITROSTAT) SL tablet 0.4 mg (has no administration in time range)   aluminum-magnesium hydroxide-simethicone (MAALOX MAX) 400-400-40 MG/5ML suspension 15 mL (15 mL Oral Given 12/8/21 0023)   Lidocaine Viscous HCl (XYLOCAINE) 2 % solution 15 mL (15 mL Mouth/Throat Given 12/8/21 0024)   famotidine (PEPCID) injection 20 mg (20 mg Intravenous Given 12/8/21 0022)   Morphine injection 6 mg (6 mg Intravenous Given 12/8/21 0126)   ondansetron (ZOFRAN) injection 4 mg (4 mg Intravenous Given 12/8/21 0124)   iopamidol (ISOVUE-300) 61 % injection 100 mL (85 mL Intravenous Given 12/8/21 0138)   metoclopramide (REGLAN) injection 5 mg (5 mg Intravenous Given 12/8/21 0223)   HYDROmorphone (DILAUDID) injection 0.5 mg (0.5 mg Intravenous Given 12/8/21 0223)       Progress and Consult Notes:  01:30:  Patient care transferred from Dr. Kumari pending CT scan results and final disposition.      ED Course as of 12/08/21 0418   Wed Dec 08, 2021   0016 EKG           EKG time: 0002  Rhythm/Rate: Sinus, 65  P waves and FL: LETICIA within normal limits  QRS, axis: Narrow  ST and T waves: No STEMI; QTC within normal limits    Interpreted Contemporaneously by me, independently viewed  Comparison: Unavailable   [RS]   0123 Initial laboratory and radiologic testing is negative.  Patient feels worse now than before.  He has now vomited.  Repeat abdominal examination locates the pain mostly in the right upper  quadrant on examination at this time which is very tender.  We will plan IV morphine and Zofran followed by a CT of the abdomen pelvis.  Patient agreeable with this plan. [RS]   0124 Case discussed with Adarsh Faith PA-C who is agreeable to follow-up on CT scan and make final disposition for this patient. [RS]   0142 WBC: 10.12 [JANNIE]   0146 Hemoglobin: 15.6 [JANNIE]   0146 Platelets: 206 [JANNIE]   0146 Glucose(!): 131 [JANNIE]   0146 BUN: 13 [JANNIE]   0146 Creatinine(!): 0.73 [JANNIE]   0146 Sodium: 140 [JANNIE]   0146 Potassium: 3.7 [JANNIE]   0146 CO2: 27.5 [JANNIE]   0146 Total Bilirubin: 0.2 [JANNIE]   0146 Alkaline Phosphatase: 107 [JANNIE]   0146 AST (SGOT): 29 [JANNIE]   0146 ALT (SGPT)(!): 51 [JANNIE]   0146 Lipase: 32 [JANNIE]   0146 Troponin T: <0.010 [JANNIE]   0200 CT results discussed with radiology, recommended further evaluation with gallbladder ultrasound if clinical concern for cholecystitis. [JANNIE]   0210 Patient rechecked, continued pain and vomiting.  Will repeat antiemetics and analgesics.  On exam patient has moderate tenderness and guarding in the right upper quadrant and epigastric area.  Gallbladder ultrasound has been ordered for further evaluation. [JANNIE]   0313 Ultrasound results discussed with radiology, patient has cholelithiasis and suspects cholecystitis. [JANNIE]   0413 Patient care discussed with Dr. Cruz, will admit. [JANNIE]      ED Course User Index  [JANNIE] Adarsh Christine PA  [RS] Timmy Kumari MD         Diagnosis:  Final diagnoses:   Acute cholecystitis   Nausea and vomiting, intractability of vomiting not specified, unspecified vomiting type   Calculus of gallbladder and bile duct with acute cholecystitis, with obstruction       DISPOSITION:  ADMISSION    Discussed treatment plan and reason for admission with pt/family and admitting physician.  Pt/family voiced understanding of the plan for admission for further testing/treatment as needed.            Follow Up:  No follow-up provider specified.    RX:     Medication List      No changes  were made to your prescriptions during this visit.         Provider attestation:  I personally reviewed the past medical history, past surgical history, social history, family history, current medications, and allergies as they appear in the chart.    The patient was seen and examined by myself and Dr. Kumari, who agree with plan.          Adarsh Christine PA  12/08/21 0418

## 2021-12-08 NOTE — H&P
Date of Service: 12/8/2021    Admission note    Admitting Physician: Richard Cruz MD    Reason for Admission: Acute cholecystitis    Chief Complaint   Patient presents with   • Chest Pain   • Back Pain       Subjective: Gal Shirley is a 26 y.o. male who was admitted to the hospital with abdominal pain.  Patient reports abdominal pain that started yesterday around 3 PM.  The pain was located over the epigastric area and radiated to the right upper quadrant and back.  He went to work and took [employer].  This made his pain worse.  He developed nausea and several episodes nonbloody bilious vomiting.  He reports having similar episodes of abdominal pain last year that lasted for 1 day and improved by itself.  He denies any change in the color of the urine or the stool.    History reviewed. No pertinent past medical history.    History reviewed. No pertinent surgical history.    No current facility-administered medications on file prior to encounter.     No current outpatient medications on file prior to encounter.       No Known Allergies    Social History     Socioeconomic History   • Marital status:    Tobacco Use   • Smoking status: Never Smoker   Substance and Sexual Activity   • Alcohol use: Never   • Sexual activity: Defer       History reviewed. No pertinent family history.    REVIEW OF SYSTEMS    CONSTITUTIONAL: Denies fevers, chills, unintentional weight loss or weight gain  RESPIRATORY: Denies chronic cough or sob.   CARDIAC: Denies chest pain, palpitations, edema  GI: Denies dyspepsia, reflux, heartburn, diarrhea or constipation  : Denies dysuria or hematuria.    MUSCULOSKELETAL: Denies muscle weakness and pain   NEURO: Denies chronic headaches.   ENDOCRINE: Denies significant heat or cold intolerance or history of thyroid problems.    DERM: no rashes,lesions or discharge.     Physical Examination  /79 (BP Location: Right arm, Patient Position: Lying)   Pulse 85   Temp 97.1 °F  "(36.2 °C) (Oral)   Resp 16   Ht 170.2 cm (67\")   Wt 72.6 kg (160 lb)   SpO2 97%   BMI 25.06 kg/m²   Body mass index is 25.06 kg/m².  GENERAL:alert, well appearing, and in no distress and oriented to person, place, and time  HEENT: normochephalic, atraumatic, no scleral icterus moist mucous membranes.  NECK: Supple there is no thyromegaly or lymphadenopathy  CHEST: clear to auscultation, no wheezes, rales or rhonchi, symmetric air entry  CARDIAC: regular rate and rhythm    ABDOMEN: soft, tender over epigastric area and right upper quadrant, nondistended, no masses or organomegaly, positive Marte's sign.  No tenderness over the right lower quadrant  EXTREMITIES: no cyanosis, clubbing or edema    NEURO: alert and oriented, normal speech, cranial nerves 2-12 grossly intact, no focal deficits   SKIN: Moist, warm, no rashes.    Radiographic Studies:  The patient did have CT scan of the abdomen and pelvis that show evidence of distended gallbladder with mild gallbladder wall thickening.  There is no pericholecystic fluids, evidence of gallstones.  Appendix read as slightly enlarged by radiologist, I do not see the enlargement that he is referring to.  There is no stranding or signs of appendicitis    Ultrasound of the gallbladder was performed yesterday that show evidence of distended gallbladder with gallstones with single stone at the neck of the gallbladder.  Common bile duct 6 mm.  No pericholecystic fluid    Laboratory:  WBC   Date Value Ref Range Status   12/08/2021 10.12 3.40 - 10.80 10*3/mm3 Final     RBC   Date Value Ref Range Status   12/08/2021 5.55 4.14 - 5.80 10*6/mm3 Final     Hemoglobin   Date Value Ref Range Status   12/08/2021 15.6 13.0 - 17.7 g/dL Final     Hematocrit   Date Value Ref Range Status   12/08/2021 45.1 37.5 - 51.0 % Final     Platelets   Date Value Ref Range Status   12/08/2021 206 140 - 450 10*3/mm3 Final     No results found for: AMYLASE  Lipase   Date Value Ref Range Status "   12/08/2021 32 13 - 60 U/L Final      ALT 51 and glucose 131, otherwise normal CMP  Normal CBC    Assessment:   Gal Shirley is a 26 y.o. male who presents with symptoms consistent with acute cholecystitis, cholelithiasis.  There is no pain in the right lower quadrant and there is no concern for appendicitis.  Discussed with the patient about further step.  I discussed the possibility of conservative management versus surgical cholecystectomy.  I personally recommend that he undergoes laparoscopic cholecystectomy with intraoperative cholangiogram to avoid further episodes of acute cholecystitis and risk of developing cholangitis, pancreatitis.  Risk and benefits of procedure including bleeding, infection, biliary tree injury, intra-abdominal injuries were discussed in detail with the patient that verbalized understanding and agree with the plan        Diagnosis Plan   1. Acute cholecystitis     2. Nausea and vomiting, intractability of vomiting not specified, unspecified vomiting type     3. Calculus of gallbladder and bile duct with acute cholecystitis, with obstruction              Plan:      Laparoscopic Cholecystectomy with IOC under General anesthesia    - Surgery scheduling initiated.  - NPO  - I will obtain consent  - Cont. IVF  - DVT Propylaxis    Risks and rationale for the procedure were discussed with the patient including but not limited to bleeding, infection, converting to and open procedure, bile duct injury and the bowel function changes associated with this surgery. All questions were answered and the patient was willing to proceed with the above recommendations.All questions were   answered at this time.    Richard Cruz MD  General, Minimally Invasive and Endoscopic Surgery  [facility]    [address], [address]  [place], [place], [address]  P: [phone]  F: [phone]

## 2021-12-09 LAB
ALBUMIN SERPL-MCNC: 3.8 G/DL (ref 3.5–5.2)
ALBUMIN/GLOB SERPL: 1.2 G/DL
ALP SERPL-CCNC: 68 U/L (ref 39–117)
ALT SERPL W P-5'-P-CCNC: 106 U/L (ref 1–41)
ANION GAP SERPL CALCULATED.3IONS-SCNC: 8.4 MMOL/L (ref 5–15)
AST SERPL-CCNC: 88 U/L (ref 1–40)
BASOPHILS # BLD AUTO: 0.02 10*3/MM3 (ref 0–0.2)
BASOPHILS # BLD AUTO: 0.02 10*3/MM3 (ref 0–0.2)
BASOPHILS NFR BLD AUTO: 0.1 % (ref 0–1.5)
BASOPHILS NFR BLD AUTO: 0.1 % (ref 0–1.5)
BILIRUB SERPL-MCNC: 0.7 MG/DL (ref 0–1.2)
BUN SERPL-MCNC: 14 MG/DL (ref 6–20)
BUN/CREAT SERPL: 20 (ref 7–25)
CALCIUM SPEC-SCNC: 8.8 MG/DL (ref 8.6–10.5)
CHLORIDE SERPL-SCNC: 104 MMOL/L (ref 98–107)
CO2 SERPL-SCNC: 24.6 MMOL/L (ref 22–29)
CREAT SERPL-MCNC: 0.7 MG/DL (ref 0.76–1.27)
DEPRECATED RDW RBC AUTO: 38 FL (ref 37–54)
DEPRECATED RDW RBC AUTO: 39.7 FL (ref 37–54)
EOSINOPHIL # BLD AUTO: 0 10*3/MM3 (ref 0–0.4)
EOSINOPHIL # BLD AUTO: 0 10*3/MM3 (ref 0–0.4)
EOSINOPHIL NFR BLD AUTO: 0 % (ref 0.3–6.2)
EOSINOPHIL NFR BLD AUTO: 0 % (ref 0.3–6.2)
ERYTHROCYTE [DISTWIDTH] IN BLOOD BY AUTOMATED COUNT: 12.7 % (ref 12.3–15.4)
ERYTHROCYTE [DISTWIDTH] IN BLOOD BY AUTOMATED COUNT: 12.8 % (ref 12.3–15.4)
GFR SERPL CREATININE-BSD FRML MDRD: 137 ML/MIN/1.73
GFR SERPL CREATININE-BSD FRML MDRD: >150 ML/MIN/1.73
GLOBULIN UR ELPH-MCNC: 3.2 GM/DL
GLUCOSE SERPL-MCNC: 219 MG/DL (ref 65–99)
HCT VFR BLD AUTO: 41.3 % (ref 37.5–51)
HCT VFR BLD AUTO: 42 % (ref 37.5–51)
HGB BLD-MCNC: 14 G/DL (ref 13–17.7)
HGB BLD-MCNC: 14.1 G/DL (ref 13–17.7)
IMM GRANULOCYTES # BLD AUTO: 0.05 10*3/MM3 (ref 0–0.05)
IMM GRANULOCYTES # BLD AUTO: 0.08 10*3/MM3 (ref 0–0.05)
IMM GRANULOCYTES NFR BLD AUTO: 0.3 % (ref 0–0.5)
IMM GRANULOCYTES NFR BLD AUTO: 0.4 % (ref 0–0.5)
LYMPHOCYTES # BLD AUTO: 0.72 10*3/MM3 (ref 0.7–3.1)
LYMPHOCYTES # BLD AUTO: 1.09 10*3/MM3 (ref 0.7–3.1)
LYMPHOCYTES NFR BLD AUTO: 4 % (ref 19.6–45.3)
LYMPHOCYTES NFR BLD AUTO: 7.3 % (ref 19.6–45.3)
MCH RBC QN AUTO: 28.3 PG (ref 26.6–33)
MCH RBC QN AUTO: 28.5 PG (ref 26.6–33)
MCHC RBC AUTO-ENTMCNC: 33.3 G/DL (ref 31.5–35.7)
MCHC RBC AUTO-ENTMCNC: 34.1 G/DL (ref 31.5–35.7)
MCV RBC AUTO: 83.6 FL (ref 79–97)
MCV RBC AUTO: 84.8 FL (ref 79–97)
MONOCYTES # BLD AUTO: 1 10*3/MM3 (ref 0.1–0.9)
MONOCYTES # BLD AUTO: 1.04 10*3/MM3 (ref 0.1–0.9)
MONOCYTES NFR BLD AUTO: 5.6 % (ref 5–12)
MONOCYTES NFR BLD AUTO: 7 % (ref 5–12)
NEUTROPHILS NFR BLD AUTO: 12.68 10*3/MM3 (ref 1.7–7)
NEUTROPHILS NFR BLD AUTO: 16.07 10*3/MM3 (ref 1.7–7)
NEUTROPHILS NFR BLD AUTO: 85.3 % (ref 42.7–76)
NEUTROPHILS NFR BLD AUTO: 89.9 % (ref 42.7–76)
NRBC BLD AUTO-RTO: 0 /100 WBC (ref 0–0.2)
NRBC BLD AUTO-RTO: 0 /100 WBC (ref 0–0.2)
PLATELET # BLD AUTO: 182 10*3/MM3 (ref 140–450)
PLATELET # BLD AUTO: 190 10*3/MM3 (ref 140–450)
PMV BLD AUTO: 10.9 FL (ref 6–12)
PMV BLD AUTO: 11 FL (ref 6–12)
POTASSIUM SERPL-SCNC: 3.6 MMOL/L (ref 3.5–5.2)
PROT SERPL-MCNC: 7 G/DL (ref 6–8.5)
RBC # BLD AUTO: 4.94 10*6/MM3 (ref 4.14–5.8)
RBC # BLD AUTO: 4.95 10*6/MM3 (ref 4.14–5.8)
SODIUM SERPL-SCNC: 137 MMOL/L (ref 136–145)
WBC NRBC COR # BLD: 14.88 10*3/MM3 (ref 3.4–10.8)
WBC NRBC COR # BLD: 17.89 10*3/MM3 (ref 3.4–10.8)

## 2021-12-09 PROCEDURE — 25010000002 PIPERACILLIN SOD-TAZOBACTAM PER 1 G: Performed by: SURGERY

## 2021-12-09 PROCEDURE — 80053 COMPREHEN METABOLIC PANEL: CPT | Performed by: SURGERY

## 2021-12-09 PROCEDURE — G0378 HOSPITAL OBSERVATION PER HR: HCPCS

## 2021-12-09 PROCEDURE — 25010000002 KETOROLAC TROMETHAMINE PER 15 MG: Performed by: SURGERY

## 2021-12-09 PROCEDURE — 99024 POSTOP FOLLOW-UP VISIT: CPT | Performed by: SURGERY

## 2021-12-09 PROCEDURE — 25010000002 HYDROMORPHONE PER 4 MG: Performed by: SURGERY

## 2021-12-09 PROCEDURE — 25010000002 ENOXAPARIN PER 10 MG: Performed by: SURGERY

## 2021-12-09 PROCEDURE — 85025 COMPLETE CBC W/AUTO DIFF WBC: CPT | Performed by: SURGERY

## 2021-12-09 RX ORDER — OXYCODONE HYDROCHLORIDE AND ACETAMINOPHEN 5; 325 MG/1; MG/1
1 TABLET ORAL EVERY 6 HOURS PRN
Status: DISCONTINUED | OUTPATIENT
Start: 2021-12-09 | End: 2021-12-10 | Stop reason: HOSPADM

## 2021-12-09 RX ORDER — HYDROMORPHONE HYDROCHLORIDE 1 MG/ML
0.5 INJECTION, SOLUTION INTRAMUSCULAR; INTRAVENOUS; SUBCUTANEOUS
Status: DISCONTINUED | OUTPATIENT
Start: 2021-12-09 | End: 2021-12-09 | Stop reason: SDUPTHER

## 2021-12-09 RX ADMIN — HYDROMORPHONE HYDROCHLORIDE 0.5 MG: 1 INJECTION, SOLUTION INTRAMUSCULAR; INTRAVENOUS; SUBCUTANEOUS at 13:14

## 2021-12-09 RX ADMIN — KETOROLAC TROMETHAMINE 15 MG: 15 INJECTION, SOLUTION INTRAMUSCULAR; INTRAVENOUS at 13:14

## 2021-12-09 RX ADMIN — TAZOBACTAM SODIUM AND PIPERACILLIN SODIUM 3.38 G: 375; 3 INJECTION, SOLUTION INTRAVENOUS at 09:36

## 2021-12-09 RX ADMIN — TAZOBACTAM SODIUM AND PIPERACILLIN SODIUM 3.38 G: 375; 3 INJECTION, SOLUTION INTRAVENOUS at 01:29

## 2021-12-09 RX ADMIN — SODIUM CHLORIDE, POTASSIUM CHLORIDE, SODIUM LACTATE AND CALCIUM CHLORIDE 100 ML/HR: 600; 310; 30; 20 INJECTION, SOLUTION INTRAVENOUS at 15:59

## 2021-12-09 RX ADMIN — TAZOBACTAM SODIUM AND PIPERACILLIN SODIUM 3.38 G: 375; 3 INJECTION, SOLUTION INTRAVENOUS at 18:34

## 2021-12-09 RX ADMIN — OXYCODONE AND ACETAMINOPHEN 1 TABLET: 5; 325 TABLET ORAL at 20:03

## 2021-12-09 RX ADMIN — ENOXAPARIN SODIUM 40 MG: 40 INJECTION SUBCUTANEOUS at 20:03

## 2021-12-09 NOTE — OP NOTE
PREOPERATIVE DIAGNOSIS: Acute cholecystitis, cholelithiasis  POSTOPERATIVE DIAGNOSIS: Acute hemorrhagic cholecystitis, cholelithiasis  PROCEDURE: Laparoscopic cholecystectomy with intraoperative cholangiogram  SURGEON/STAFF: ROSA M Cruz    Assistant: Danelle Aguilar PA-C; Silvano Blanco PA  was responsible for performing the following activities: Retraction, Suction, Irrigation, Suturing, Closing, Placing Dressing and Held/Positioned Camera and their skilled assistance was necessary for the success of this case    SPECIMENS: Gallbladder.  INTRAOPERATIVE COMPLICATIONS: None.  ANESTHESIA: General.  BLOOD LOSS: 100ml  COUNTS: Needle and sponge counts correct.   Findings: Acute cholecystitis, cholelithiasis    INDICATIONS: This is a pleasant patient who presented with evidence of acute cholecystitis. He was admitted to the hospital for further treatment. He was evaluated and I recommend the patient to undergo a laparoscopic cholecystectomy with intraoperative cholangiogram, possible open. Risks and benefits of laparoscopic, open, and conservative management of his cholecystitis were discussed at length and in detail with the patient. This included detail drawings of the anatomy and possible postoperative complications including but not limited to bile leak, retained stone, bleeding and common bile duct injury. The patient agreed and was consented for operative management.     PROCEDURE: The patient was brought to the operating room in stable condition. Perioperative antibiotics were given and sequential compression devices applied. He was then laid supine on the operating room table. General anesthesia was induced by the Anesthesia Service without difficulty.     At this time, the patient's abdomen was accessed at the level of the umbilicus in open cutdown technique and insertion of a 5-mm trocar. The abdomen was then insufflated. Brief survey of the abdominal cavity revealed adhesions of the omentum to the gallbladder  that was very distended and tense There was no evidence of injury from insertion of the trocar. At this time the patient was placed in steep reverse Trendelenburg and a slightly left-side down position.  In the epigastric area  2 additional 5 mm trochars were placed in the right upper quadrant and a 11 mm trocar was placed    I decompressed the gallbladder with laparoscopic needle and 60 cc of dark bilious fluid was drained  The operation was initiated by taking down the omental attachments using a combination of blunt and sharp dissection with careful use of electrocautery where needed. Having freed the omentum from the gallbladder, it was retracted cephalad.  The gallbladder was extremely large.    The peritoneal attachment of the gallbladder at the level of the infundibulum was scored from laterally to medially, terminating at the level of the hepatic edge. The peritoneum was gently stripped down.   There was dense adhesions from chronic inflammation at the level of the infundibulum.  The dissection of the infundibulum and the scar tissue at this level finally revealed the cystic artery.  There was a single structure going into the common bile duct inferior to the cystic artery.  Because the gallbladder was folded over itself it was extremely difficult to expose the cystic duct so I decided to clip and ligated the cystic artery twice with hemolocks.  At this point I was able to visualize the cystic duct and there was liver tissue behind it.  I inserted a 5 mm trocar in the right subcostal area under direct laparoscopic vision to help aid in retraction of the liver to have better visualization of the cystic duct.  I also decided to place an 11 mm trochars replacing the umbilical trocar to allow use of larger, not to have better visualization.  This was done under direct laparoscopic vision    A Hemo-Lock clip was placed distally at the cystic duct and a cystic duct incision with laparoscopic scissors was  performed. A 16 Fr sheet was placed through the patient abdominal wall and a Cholangio-catheter was inserted into the patient abdomen. The catheter was secured inside the cystic duct with a metallic clip. Cholangiogram was performed that showed filling of the cystic duct as well as the common bile duct and the right and left hepatic ducts. There’s prompt emptying of the contrast into the duodenum and there’s no evidence of filling defects. Next, the metallic clip was removed and the cystic duct was once again visualized and after confirming that it was indeed the cystic duct, it was clipped twice proximally. It was then transected. The gallbladder, which was extremely  inflamed, was then carefully dissected off the hepatic bed using hook electrocautery. It was firmly adherent to the underlying bed, and an effort careful and meticulous hemostasis was undertaken.  A branch of the cystic artery was found and was clipped and ligated.  The gallbladder, after being removed from the hepatic bed, was placed in an EndoCatch bag. It was removed through the umbilical trocar without difficulty.     Next, there were seen to be several areas of oozing from the hepatic bed. These were controlled with electrocautery, Heidy hemostatic agents as well as Surgicel.  Decided to place a 19 [place] drain of the gallbladder bed.  This was suture to the skin with 2-0 nylon.  There was no evidence of continued bleeding. The gallbladder bed was then irrigated with normal saline. The clips were once again inspected and seemed to be intact. There was no evidence of any bleeding.        A final complete survey of the abdominal cavity was undertaken, and there was no evidence of bleeding or intrabdominal injury. Next, all the trocars were removed under direct laparoscopic vision and the pneumoperitoneum was evacuated.  The epigastric trocar was removed and the defect was closed with 0 Vicryl and Endo Close technique.  All the other trochars  were removed under the laparoscopic vision.  The umbilical trocar was removed  The umbilical fascia was closed with interrupted 0 Vicryl suture.The skin incisions were closed with 4-0 Monocryl and surgical glue. At the end of the case, all needle and sponge counts were correct. I, the attending surgeon, was scrubbed, present and persisted in the entire operation. The patient was extubated and taken to the recovery room in stable condition.

## 2021-12-09 NOTE — ANESTHESIA POSTPROCEDURE EVALUATION
Patient: Gal Shirley    Procedure Summary     Date: 12/08/21 Room / Location: [facility] OR [address] [place] [address] MAIN OR    Anesthesia Start: 2106 Anesthesia Stop: 12/09/21 0009    Procedure: CHOLECYSTECTOMY LAPAROSCOPIC INTRAOPERATIVE CHOLANGIOGRAM (N/A Abdomen) Diagnosis:     Surgeons: Richard Cruz MD Provider: Federico Hu MD    Anesthesia Type: general ASA Status: 1          Anesthesia Type: general    Vitals  Vitals Value Taken Time   /72 12/09/21 0051   Temp 37.3 °C (99.2 °F) 12/09/21 0100   Pulse 110 12/09/21 0100   Resp 18 12/09/21 0100   SpO2 96 % 12/09/21 0100           Post Anesthesia Care and Evaluation      Comments: Patient discharged before being evaluated by an Anesthesiologist.  No apparent complications per the record.  THIS CASE WAS NOT MEDICALLY DIRECTED

## 2021-12-09 NOTE — BRIEF OP NOTE
CHOLECYSTECTOMY LAPAROSCOPIC INTRAOPERATIVE CHOLANGIOGRAM  Progress Note    Gal Shirley  12/8/2021    Pre-op Diagnosis:   Acute cholecystitis       Post-Op Diagnosis Codes:   Acute cholecystitis, cholelithiasis    Procedure/CPT® Codes:        Procedure(s):  CHOLECYSTECTOMY LAPAROSCOPIC INTRAOPERATIVE CHOLANGIOGRAM    Surgeon(s):  Richard Cruz MD    Anesthesia: General    Staff:   Circulator: Polly Deluna RN; Basilia Nunez RN  Radiology Technologist: Arabella Owusu  Scrub Person: Raoul Santamaria John  Assistant: Danelle Aguilar PA-C; Silvano Blanco PA  Assistant: Danelle Aguilar PA-C; Silvano Blanco PA      Estimated Blood Loss: 100ml    Urine Voided: * No values recorded between 12/8/2021  9:03 PM and 12/8/2021 11:45 PM *    Specimens:                Specimens     ID Source Type Tests Collected By Collected At Frozen?    A Gallbladder Tissue · TISSUE PATHOLOGY EXAM   Richard Cruz MD 12/8/21 9300 No    Description: Gallbladder    This specimen was not marked as sent.                Drains:   Closed/Suction Drain 1 Right RLQ Bulb 19 Fr. (Active)       Findings: Acute cholecystitis. Cholelithiasis     Complications: None    Assistant: Danelle Aguilar PA-C; Silvano Blanco PA  was responsible for performing the following activities: Retraction, Suction, Irrigation, Suturing, Closing, Placing Dressing and Held/Positioned Camera and their skilled assistance was necessary for the success of this case.    Richard Cruz MD     Date: 12/8/2021  Time: 23:45 EST

## 2021-12-09 NOTE — CASE MANAGEMENT/SOCIAL WORK
Discharge Planning Assessment  [facility]     Patient Name: Gal Shirley  MRN: [id number]  Today's Date: 12/9/2021    Admit Date: 12/8/2021     Discharge Needs Assessment     Row Name 12/09/21 1326       Living Environment    Lives With spouse    Current Living Arrangements home/apartment/condo    Primary Care Provided by self    Provides Primary Care For no one    Family Caregiver if Needed spouse    Quality of Family Relationships involved; helpful    Able to Return to Prior Arrangements yes       Resource/Environmental Concerns    Resource/Environmental Concerns none       Transition Planning    Patient/Family Anticipates Transition to home with family    Patient/Family Anticipated Services at Transition none    Transportation Anticipated family or friend will provide       Discharge Needs Assessment    Readmission Within the Last 30 Days no previous admission in last 30 days    Equipment Currently Used at Home none    Concerns to be Addressed no discharge needs identified; denies needs/concerns at this time    Provided Post Acute Provider List? N/A               Discharge Plan     Row Name 12/09/21 1326       Plan    Plan home; denies needs    Patient/Family in Agreement with Plan yes    Plan Comments Spoke with pt bedside. Confirmed facesheet correct. Explained role of CCP. Pt lives with his wife. Pt is [facility] no DME used.  Pt has never used HH or SNF and pt denies need for HH. Pt reports he doesn’t have PCP. Offered information on [facility] or [facility] but pt declined stating he knows how to contact them. He reports he uses [facility] with no issues but is agreeable to meds to beds. Pt denies d/c. needs. CCP to follow.              Continued Care and Services - Admitted Since 12/8/2021    Coordination has not been started for this encounter.          Demographic Summary    No documentation.                Functional Status    No documentation.                Psychosocial    No documentation.                 Abuse/Neglect    No documentation.                Legal    No documentation.                Substance Abuse    No documentation.                Patient Forms    No documentation.                   IZABELA Lobo

## 2021-12-09 NOTE — PROGRESS NOTES
Postop day 1 s/p laparoscopic cholecystectomy with intraoperative cholangiogram for gangrenous hemorrhagic cholecystitis    S: No events overnight.  Tolerating clear liquid diet.  No nausea or vomiting    O:   Vitals:    12/09/21 0420 12/09/21 0714 12/09/21 1127 12/09/21 1630   BP: 111/67 105/60 99/58 100/54   BP Location: Right arm Right arm Right arm Right arm   Patient Position: Lying Lying Lying Lying   Pulse: 109 95 93 89   Resp: 18   18   Temp: 97.9 °F (36.6 °C) 97.8 °F (36.6 °C)  98.6 °F (37 °C)   TempSrc: Oral Oral  Oral   SpO2: 96% 96% 96% 96%   Weight:       Height:         Bo drain with 20 cc  Alert oriented x3, no acute distress  Abdomen soft, appropriate tender, nondistended, incisions clean dry and intact    White blood cell count 17.8, elevated from postop inflammatory response  Hemoglobin 14  Platelets 190  LFTs mildly elevated uptrending today    Assessment and plan    Postoperative day 1 status post laparoscopic cholecystectomy with intraoperative cholangiogram for gangrenous hemorrhagic cholecystitis.  He is doing much better.  Pain is well controlled.  Tolerating clear liquid diet    -We will advance diet as tolerated  -P.o. pain control  -SCDs and Lovenox  -Ambulate  -We will keep drain today, possible DC tomorrow  -Continue IV antibiotics

## 2021-12-09 NOTE — PLAN OF CARE
Goal Outcome Evaluation:              Outcome Summary: Pt aox4, up with assist and on room air. Pt tolerating clear liquid diet; advanced as tolerated. Lap darnell with 4 lap site bibi, c/d/i. Alfredo drain with moderate amount of serosanguineous output. No complaints this shift. VSS will continue to monitor.

## 2021-12-09 NOTE — PLAN OF CARE
Goal Outcome Evaluation:    A&OX4, VSS, Pt complained of gas pain today. RN educated importance of ambulating Gave dilaudid once, and tordal once for pain. JAC site CDI, minimal ouput, Lap sites dermabond C&D. Pt tolerating regular diet will, no c/o nausea. Up ad michelle, call light tray table all within reach will cont to monitor.    palpitations

## 2021-12-09 NOTE — ANESTHESIA PROCEDURE NOTES
Airway  Urgency: elective    Date/Time: 12/8/2021 9:11 PM  Airway not difficult    General Information and Staff    Patient location during procedure: OR  CRNA: Nirmal Kimball CRNA    Indications and Patient Condition  Indications for airway management: airway protection    Preoxygenated: yes  MILS maintained throughout  Mask difficulty assessment: 0 - not attempted    Final Airway Details  Final airway type: endotracheal airway      Successful airway: ETT  Cuffed: yes   Successful intubation technique: direct laryngoscopy and RSI  Facilitating devices/methods: cricoid pressure  Endotracheal tube insertion site: oral  Blade: Jagdish  Blade size: 3  ETT size (mm): 7.0  Cormack-Lehane Classification: grade I - full view of glottis  Placement verified by: chest auscultation   Measured from: teeth  ETT/EBT  to teeth (cm): 22  Number of attempts at approach: 1  Assessment: lips, teeth, and gum same as pre-op and atraumatic intubation

## 2021-12-10 VITALS
OXYGEN SATURATION: 96 % | RESPIRATION RATE: 16 BRPM | DIASTOLIC BLOOD PRESSURE: 54 MMHG | TEMPERATURE: 98.5 F | WEIGHT: 160 LBS | SYSTOLIC BLOOD PRESSURE: 102 MMHG | BODY MASS INDEX: 25.11 KG/M2 | HEIGHT: 67 IN | HEART RATE: 79 BPM

## 2021-12-10 PROBLEM — K81.0 ACUTE CHOLECYSTITIS: Status: ACTIVE | Noted: 2021-12-10

## 2021-12-10 PROBLEM — K81.0 ACUTE CHOLECYSTITIS: Status: RESOLVED | Noted: 2021-12-10 | Resolved: 2021-12-10

## 2021-12-10 PROCEDURE — 25010000002 PIPERACILLIN SOD-TAZOBACTAM PER 1 G: Performed by: SURGERY

## 2021-12-10 PROCEDURE — 99024 POSTOP FOLLOW-UP VISIT: CPT | Performed by: SURGERY

## 2021-12-10 RX ORDER — ONDANSETRON 4 MG/1
4 TABLET, FILM COATED ORAL EVERY 6 HOURS PRN
Qty: 20 TABLET | Refills: 0 | Status: SHIPPED | OUTPATIENT
Start: 2021-12-10

## 2021-12-10 RX ORDER — OXYCODONE HYDROCHLORIDE AND ACETAMINOPHEN 5; 325 MG/1; MG/1
1 TABLET ORAL EVERY 6 HOURS PRN
Qty: 30 TABLET | Refills: 0 | Status: SHIPPED | OUTPATIENT
Start: 2021-12-10 | End: 2021-12-18

## 2021-12-10 RX ORDER — AMOXICILLIN 250 MG
2 CAPSULE ORAL DAILY PRN
Qty: 30 TABLET | Refills: 1 | Status: SHIPPED | OUTPATIENT
Start: 2021-12-10 | End: 2022-12-10

## 2021-12-10 RX ORDER — AMOXICILLIN AND CLAVULANATE POTASSIUM 875; 125 MG/1; MG/1
1 TABLET, FILM COATED ORAL 2 TIMES DAILY
Qty: 14 TABLET | Refills: 0 | Status: SHIPPED | OUTPATIENT
Start: 2021-12-10 | End: 2021-12-17

## 2021-12-10 RX ADMIN — TAZOBACTAM SODIUM AND PIPERACILLIN SODIUM 3.38 G: 375; 3 INJECTION, SOLUTION INTRAVENOUS at 09:54

## 2021-12-10 RX ADMIN — TAZOBACTAM SODIUM AND PIPERACILLIN SODIUM 3.38 G: 375; 3 INJECTION, SOLUTION INTRAVENOUS at 01:15

## 2021-12-10 NOTE — PLAN OF CARE
Goal Outcome Evaluation:  Plan of Care Reviewed With: patient        Progress: improving  Outcome Summary: Rested some overnight. VSS. PO pain pill given x 1 this shift. Minimal drainage from drain. Ambulating well ad michelle. Continue to monitor.

## 2021-12-10 NOTE — DISCHARGE SUMMARY
Admission date: 12/8/2021   Discharge date: 12/10/2021  1:53 PM     Admission diagnosis: Calculus of gallbladder and bile duct with acute cholecystitis, with obstruction [K80.63]  Acute cholecystitis [K81.0]  Nausea and vomiting, intractability of vomiting not specified, unspecified vomiting type [R11.2]     Discharge diagnosis: Acute cholecystitis, cholelithiasis     Procedure: Laparoscopic cholecystectomy with intraoperative cholangiogram    Hospital course: Patient was admitted to the hospital with diagnosis of acute cholecystitis.  He underwent laparoscopic cholecystectomy with intraoperative cholangiogram.  He tolerated diet on postoperative day 1 without any problem.  Pain was well controlled.  Diet was slowly advanced and he was doing extremely well on postoperative day 2.  Bo drain was removed.  Decision was to discharge the patient home in stable condition      Meds:      Your medication list        START taking these medications        Instructions Last Dose Given Next Dose Due   amoxicillin-clavulanate 875-125 MG per tablet  Commonly known as: Augmentin      [place] 1 tableta por via oral 2 (dos) veces al misti por 7 jorge.  (Take 1 tablet by mouth 2 (Two) Times a Day for 7 days.)       ondansetron 4 MG tablet  Commonly known as: ZOFRAN      [place] 1 tableta por via oral cada 6 (seis) horas robe sea necesario para nausea o vomitos.  (Take 1 tablet by mouth Every 6 (Six) Hours As Needed for Nausea or Vomiting.)       oxyCODONE-acetaminophen 5-325 MG per tablet  Commonly known as: PERCOCET      Take 1 tablet by mouth Every 6 (Six) Hours As Needed for Moderate Pain  for up to 8 days.       Senexon-S 8.6-50 MG per tablet  Generic drug: sennosides-docusate      Take 2 tablets by mouth Daily As Needed for Constipation.                 Where to Get Your Medications        These medications were sent to [facility] - [place] [id number]      Hours: 7:00 AM-6:00 PM Mon-Fri, 8:00  AM-4:30 PM Sat-Sun (Closed 12-12:30PM) Phone: [phone]   amoxicillin-clavulanate 875-125 MG per tablet  ondansetron 4 MG tablet  oxyCODONE-acetaminophen 5-325 MG per tablet  Senexon-S 8.6-50 MG per tablet         Instructions:    - No heavy lifting of more than 15 lb for 6 weeks. Can start doing aerobic type exercise in 4 weeks.   - No driving while taking pain medications  - Take medications as prescribed.   - Can shower, no bath tub    Follow up: Dr. Cruz in 2 weeks, call for appointment      Richard Cruz MD  General, Minimally Invasive and Endoscopic Surgery  [facility]

## 2021-12-10 NOTE — CASE MANAGEMENT/SOCIAL WORK
Case Management Discharge Note      Final Note: Home---no needs    Provided Post Acute Provider List?: N/A    Selected Continued Care - Admitted Since 12/8/2021     Destination    No services have been selected for the patient.              Durable Medical Equipment    No services have been selected for the patient.              Dialysis/Infusion    No services have been selected for the patient.              Home Medical Care    No services have been selected for the patient.              Therapy    No services have been selected for the patient.              Community Resources    No services have been selected for the patient.              Community & DME    No services have been selected for the patient.                       Final Discharge Disposition Code: 01 - home or self-care

## 2021-12-10 NOTE — PLAN OF CARE
Goal Outcome Evaluation:    A&OX4, JAC drain removed, tolerated well. No pain noted. IV site removed as well. D/C home with planned follow up.

## 2021-12-13 LAB
LAB AP CASE REPORT: NORMAL
PATH REPORT.FINAL DX SPEC: NORMAL
PATH REPORT.GROSS SPEC: NORMAL

## 2021-12-22 ENCOUNTER — OFFICE VISIT (OUTPATIENT)
Dept: SURGERY | Facility: CLINIC | Age: 26
End: 2021-12-22

## 2021-12-22 VITALS — HEIGHT: 62 IN | WEIGHT: 168.6 LBS | BODY MASS INDEX: 31.03 KG/M2

## 2021-12-22 DIAGNOSIS — K59.00 CONSTIPATION, UNSPECIFIED CONSTIPATION TYPE: Primary | ICD-10-CM

## 2021-12-22 PROCEDURE — 99024 POSTOP FOLLOW-UP VISIT: CPT

## 2021-12-22 RX ORDER — POLYETHYLENE GLYCOL 3350 17 G/17G
17 POWDER, FOR SOLUTION ORAL DAILY
Qty: 30 PACKET | Refills: 3 | Status: SHIPPED | OUTPATIENT
Start: 2021-12-22 | End: 2021-12-22

## 2021-12-22 RX ORDER — POLYETHYLENE GLYCOL 3350 17 G/17G
17 POWDER, FOR SOLUTION ORAL DAILY
Qty: 30 PACKET | Refills: 3 | Status: SHIPPED | OUTPATIENT
Start: 2021-12-22

## 2021-12-22 NOTE — PROGRESS NOTES
General Surgery Post-Operative Follow Up Note     Summary:    Mr. Gal Shirley is a 26 y.o. year old gentleman here for a post-operative follow up from laparoscopic cholecystectomy with cholangiogram.  He reports he is doing well overall.  He denies any pain. He reports he is tolerating his diet and has had no nausea or vomiting.  He states he did not have a bowel movement post surgery until 3 days ago and he has been constipated.  Recommend he take Miralax daily until bowel movements return to normal. Patient is inquiring about returning to work, work note given.  He can return to work with a 15 pound lifting restriction until he is 6 weeks post surgery.  Pathology consistent with acute necrotizing cholecystitis and cholelithiasis. Patient can follow-up in our office as needed.    History of Present Illness:    Mr. Gal Shirley is a 26 y.o. year old gentleman here for post-operative follow up.  He states he is doing well overall.  He denies any pain.  He states he is tolerating his diet and has had no nausea or vomiting.  He states he has been constipated and has had dry stool.  He states since surgery, his first bowel movement occurred 3 days ago.  He reports he has been taking the stool softener that was prescribed for him upon his discharge from the hospital. He is inquiring about when he can return to work.    Procedure:    • 12/8/2021 laparoscopic cholecystectomy with cholangiogram     Pathology:    Final Diagnosis   1. Gallbladder, Cholecystectomy:               A. Acute necrotizing cholecystitis and cholelithiasis.                B. Reactive glandular changes, negative for dysplasia.     Physical Exam:   • Constitutional: Well-developed well-nourished, no acute distress  • Eyes: Conjunctiva normal, sclera nonicteric  • ENMT: Hearing grossly normal, oral mucosa moist  • Respiratory: No increased work of breathing, normal inspiratory effort  • Cardiovascular: Regular rate, no peripheral edema, no  jugular venous distention  • Gastrointestinal: Soft, nontender; incisions are healing well, residual glue present, no erythema, fever, or drainage  • Skin:  Warm, dry, no rash on visualized skin surfaces  • Musculoskeletal: Symmetric strength, normal gait  • Psychiatric: Alert and oriented ×3, normal affect     Danelle Aguilar PA-C  General Surgery  [facility]    [address], [address]  [place], [place], [address]  P: [phone]  F: [phone]

## (undated) DEVICE — GAUZE,SPONGE,4"X4",16PLY,STRL,LF,10/TRAY: Brand: MEDLINE

## (undated) DEVICE — ENDOPOUCH RETRIEVER SPECIMEN RETRIEVAL BAGS: Brand: ENDOPOUCH RETRIEVER

## (undated) DEVICE — SUT VIC 0/0 UR6 27IN DYED J603H

## (undated) DEVICE — DRP C/ARM 41X74IN

## (undated) DEVICE — ENDOPATH XCEL BLADELESS TROCARS WITH STABILITY SLEEVES: Brand: ENDOPATH XCEL

## (undated) DEVICE — GLV SURG BIOGEL LTX PF 7 1/2

## (undated) DEVICE — ENDOCUT SCISSOR TIP, DISPOSABLE: Brand: RENEW

## (undated) DEVICE — ENDOPATH XCEL UNIVERSAL TROCAR STABLILITY SLEEVES: Brand: ENDOPATH XCEL

## (undated) DEVICE — DRAPE,REIN 53X77,STERILE: Brand: MEDLINE

## (undated) DEVICE — LAPAROSCOPIC SMOKE FILTRATION SYSTEM: Brand: PALL LAPAROSHIELD® PLUS LAPAROSCOPIC SMOKE FILTRATION SYSTEM

## (undated) DEVICE — TROCAR SITE CLOSURE DEVICE: Brand: ENDO CLOSE

## (undated) DEVICE — CATH IV INSYTE AUTOGARD 14G 1 1/2IN ORNG

## (undated) DEVICE — EXTENSION SET, MALE LUER LOCK ADAPTER WITH RETRACTABLE COLLAR

## (undated) DEVICE — APPL HEMO ENDO SURGICEL 2IN1 1P/U STRL

## (undated) DEVICE — VISUALIZATION SYSTEM: Brand: CLEARIFY

## (undated) DEVICE — LOU LAP CHOLE: Brand: MEDLINE INDUSTRIES, INC.

## (undated) DEVICE — SOL NACL 0.9PCT 1000ML

## (undated) DEVICE — STPLR SKIN VISISTAT WD 35CT

## (undated) DEVICE — APPL CHLORAPREP HI/LITE 26ML ORNG

## (undated) DEVICE — Device

## (undated) DEVICE — SUT MNCRYL PLS ANTIB UD 4/0 PS2 18IN

## (undated) DEVICE — CATH CHOLANG 4.5F18IN BRGNDY

## (undated) DEVICE — APPL HEMO SURG ARISTA/AH/FLEXITIP XL 38CM

## (undated) DEVICE — JACKSON-PRATT 100CC BULB RESERVOIR: Brand: CARDINAL HEALTH

## (undated) DEVICE — STPCK 3WY D201 DISCOFIX